# Patient Record
Sex: FEMALE | Race: WHITE | NOT HISPANIC OR LATINO | Employment: OTHER | URBAN - METROPOLITAN AREA
[De-identification: names, ages, dates, MRNs, and addresses within clinical notes are randomized per-mention and may not be internally consistent; named-entity substitution may affect disease eponyms.]

---

## 2019-05-20 ENCOUNTER — OFFICE VISIT (OUTPATIENT)
Dept: CARDIOLOGY | Facility: MEDICAL CENTER | Age: 69
End: 2019-05-20
Payer: MEDICARE

## 2019-05-20 VITALS
DIASTOLIC BLOOD PRESSURE: 84 MMHG | HEART RATE: 60 BPM | SYSTOLIC BLOOD PRESSURE: 130 MMHG | HEIGHT: 60 IN | BODY MASS INDEX: 34.16 KG/M2 | WEIGHT: 174 LBS

## 2019-05-20 DIAGNOSIS — F41.9 ANXIETY: ICD-10-CM

## 2019-05-20 DIAGNOSIS — I49.9 IRREGULAR HEART BEATS: ICD-10-CM

## 2019-05-20 PROCEDURE — 99203 OFFICE O/P NEW LOW 30 MIN: CPT | Performed by: INTERNAL MEDICINE

## 2019-05-20 PROCEDURE — 93000 ELECTROCARDIOGRAM COMPLETE: CPT | Performed by: INTERNAL MEDICINE

## 2019-05-20 RX ORDER — CITALOPRAM 20 MG/1
20 TABLET ORAL DAILY
COMMUNITY
End: 2019-05-20 | Stop reason: SDUPTHER

## 2019-05-20 RX ORDER — HYDROCHLOROTHIAZIDE 12.5 MG/1
12.5 TABLET ORAL DAILY
COMMUNITY
End: 2019-08-14

## 2019-05-20 RX ORDER — CITALOPRAM 20 MG/1
20 TABLET ORAL DAILY
Qty: 60 TAB | Refills: 1 | Status: SHIPPED | OUTPATIENT
Start: 2019-05-20 | End: 2021-06-04

## 2019-05-20 ASSESSMENT — ENCOUNTER SYMPTOMS
DEPRESSION: 0
HEADACHES: 0
PALPITATIONS: 1
BRUISES/BLEEDS EASILY: 0
PND: 0
NERVOUS/ANXIOUS: 1
CHILLS: 0
FEVER: 0
COUGH: 0
HEARTBURN: 0
MYALGIAS: 0
NAUSEA: 0
EYE DISCHARGE: 0
BLURRED VISION: 0
SHORTNESS OF BREATH: 0
DIZZINESS: 0

## 2019-05-20 NOTE — PROGRESS NOTES
Chief Complaint   Patient presents with   • Irregular Heart Beat       Subjective:   Sherry Louis is a 68 y.o. female who presents today self-referred with a chief complaint of palpitations.  In the recent past, she is noted upon reclining, no more than 2 minutes of occasional palpitations without sustained rapid heart rate.  Episodes resolve and then she goes to sleep.  She does not notice any palpitations during the day.  She considers himself physically active and does not feel limited in any way with regard to physical activity.  She does have hypertension and takes low-dose hydrochlorothiazide.  Home blood pressures with an old home blood pressure cuff have been around 146/85.    She has no other major health issues.    Past medical history: No significant disease other than hypertension and chronic anxiety.    Family history: Negative for premature CAD.    Office EKG today is normal    History reviewed. No pertinent past medical history.  History reviewed. No pertinent surgical history.  History reviewed. No pertinent family history.  Social History     Social History   • Marital status:      Spouse name: N/A   • Number of children: N/A   • Years of education: N/A     Occupational History   • Not on file.     Social History Main Topics   • Smoking status: Former Smoker     Packs/day: 0.50     Types: Cigarettes     Quit date: 5/20/1999   • Smokeless tobacco: Never Used   • Alcohol use Yes   • Drug use: No   • Sexual activity: Not on file     Other Topics Concern   • Not on file     Social History Narrative   • No narrative on file     No Known Allergies  Outpatient Encounter Prescriptions as of 5/20/2019   Medication Sig Dispense Refill   • hydroCHLOROthiazide (HYDRODIURIL) 12.5 MG tablet Take 12.5 mg by mouth every day.     • citalopram (CELEXA) 20 MG Tab Take 1 Tab by mouth every day. 60 Tab 1   • [DISCONTINUED] citalopram (CELEXA) 20 MG Tab Take 20 mg by mouth every day.       No  facility-administered encounter medications on file as of 5/20/2019.      Review of Systems   Constitutional: Negative for chills, fever and malaise/fatigue.   Eyes: Negative for blurred vision and discharge.   Respiratory: Negative for cough and shortness of breath.    Cardiovascular: Positive for palpitations. Negative for chest pain, leg swelling and PND.   Gastrointestinal: Negative for heartburn and nausea.   Genitourinary: Negative for dysuria and urgency.   Musculoskeletal: Negative for myalgias.   Skin: Negative for itching and rash.   Neurological: Negative for dizziness and headaches.   Endo/Heme/Allergies: Negative for environmental allergies. Does not bruise/bleed easily.   Psychiatric/Behavioral: Negative for depression. The patient is nervous/anxious.         Objective:   /84 (BP Location: Left arm, Patient Position: Sitting, BP Cuff Size: Adult)   Pulse 60   Ht 1.524 m (5')   Wt 78.9 kg (174 lb)   BMI 33.98 kg/m²     Physical Exam   Constitutional: She is oriented to person, place, and time. She appears well-nourished.   Neck: No JVD present.   Cardiovascular: Normal rate and regular rhythm.  Exam reveals no gallop and no friction rub.    No murmur heard.  No carotid bruits   Pulmonary/Chest: Effort normal and breath sounds normal.   Abdominal: Soft. There is no tenderness.   Musculoskeletal: She exhibits no edema or deformity.   Neurological: She is alert and oriented to person, place, and time.   Skin: Skin is warm.   Some ecchymosis on legs.       Assessment:     1. Irregular heart beats  EKG   2. Anxiety         Medical Decision Making:  Today's Assessment / Status / Plan:   Short-lived palpitations of no clinical concern and do not need follow-up monitoring.    Hypertension with no recent follow-up noted.  She will get a primary care provider.  In the meantime, she will buy a home blood pressure cuff and return in 4 to 6 weeks with the home blood pressure cuff to discuss her  hypertension.

## 2019-05-21 LAB — EKG IMPRESSION: NORMAL

## 2019-08-14 ENCOUNTER — OFFICE VISIT (OUTPATIENT)
Dept: CARDIOLOGY | Facility: MEDICAL CENTER | Age: 69
End: 2019-08-14
Payer: MEDICARE

## 2019-08-14 VITALS
HEIGHT: 60 IN | SYSTOLIC BLOOD PRESSURE: 172 MMHG | BODY MASS INDEX: 35.53 KG/M2 | DIASTOLIC BLOOD PRESSURE: 84 MMHG | WEIGHT: 181 LBS | OXYGEN SATURATION: 96 % | HEART RATE: 60 BPM

## 2019-08-14 DIAGNOSIS — R00.2 PALPITATIONS: ICD-10-CM

## 2019-08-14 DIAGNOSIS — I10 ESSENTIAL HYPERTENSION: ICD-10-CM

## 2019-08-14 DIAGNOSIS — F41.9 ANXIETY: ICD-10-CM

## 2019-08-14 PROBLEM — I49.9 IRREGULAR HEART BEATS: Status: ACTIVE | Noted: 2019-08-14

## 2019-08-14 PROCEDURE — 99214 OFFICE O/P EST MOD 30 MIN: CPT | Performed by: NURSE PRACTITIONER

## 2019-08-14 RX ORDER — HYDROCHLOROTHIAZIDE 25 MG/1
25 TABLET ORAL DAILY
Qty: 90 TAB | Refills: 3 | Status: SHIPPED | OUTPATIENT
Start: 2019-08-14 | End: 2021-09-03 | Stop reason: SDUPTHER

## 2019-08-14 ASSESSMENT — ENCOUNTER SYMPTOMS
ABDOMINAL PAIN: 0
MYALGIAS: 0
PALPITATIONS: 0
ORTHOPNEA: 0
WEAKNESS: 0
NERVOUS/ANXIOUS: 1
COUGH: 0
DIZZINESS: 0
SHORTNESS OF BREATH: 0
PND: 0
CLAUDICATION: 0

## 2019-08-14 NOTE — PROGRESS NOTES
Chief Complaint   Patient presents with   • Irregular Heart Beat       Subjective:   Sherry Louis is a 68 y.o. female who presents today to follow-up on palpitations and hypertension.  She was seen by Dr Hammer for palpitations in May of this year.  EKG was done and was essentially normal.  Patient states that she will have a thump sensation in her chest particularly in the evening when she is anxious.  She has no rapid, sustained heart rates.  She says since she has been seen in this office she is not even noticed her palpitations.    Her blood pressure was elevated therefore she was started on hydrochlorothiazide 12.5 mg.  She is been checking her blood pressure at home with an arm cuff and states that it is been running 130-140/70-90.  She exercises by walking 5 days a week for 40 minutes.  She tolerates this well.    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  She feels anxious frequently.    Past Medical History:   Diagnosis Date   • Hypertension      History reviewed. No pertinent surgical history.  History reviewed. No pertinent family history.  Social History     Socioeconomic History   • Marital status:      Spouse name: Not on file   • Number of children: Not on file   • Years of education: Not on file   • Highest education level: Not on file   Occupational History   • Not on file   Social Needs   • Financial resource strain: Not on file   • Food insecurity:     Worry: Not on file     Inability: Not on file   • Transportation needs:     Medical: Not on file     Non-medical: Not on file   Tobacco Use   • Smoking status: Former Smoker     Packs/day: 0.50     Types: Cigarettes     Last attempt to quit: 1999     Years since quittin.2   • Smokeless tobacco: Never Used   Substance and Sexual Activity   • Alcohol use: Yes   • Drug use: No   • Sexual activity: Not on file   Lifestyle   • Physical activity:     Days per week: Not on file     Minutes per session:  Not on file   • Stress: Not on file   Relationships   • Social connections:     Talks on phone: Not on file     Gets together: Not on file     Attends [employer] service: Not on file     Active member of club or organization: Not on file     Attends meetings of clubs or organizations: Not on file     Relationship status: Not on file   • Intimate partner violence:     Fear of current or ex partner: Not on file     Emotionally abused: Not on file     Physically abused: Not on file     Forced sexual activity: Not on file   Other Topics Concern   • Not on file   Social History Narrative   • Not on file     Allergies   Allergen Reactions   • Lisinopril Cough     Outpatient Encounter Medications as of 8/14/2019   Medication Sig Dispense Refill   • hydroCHLOROthiazide (HYDRODIURIL) 25 MG Tab Take 1 Tab by mouth every day. 90 Tab 3   • citalopram (CELEXA) 20 MG Tab Take 1 Tab by mouth every day. 60 Tab 1   • [DISCONTINUED] hydroCHLOROthiazide (HYDRODIURIL) 12.5 MG tablet Take 12.5 mg by mouth every day.       No facility-administered encounter medications on file as of 8/14/2019.      Review of Systems   Constitutional: Negative for malaise/fatigue.   Respiratory: Negative for cough and shortness of breath.    Cardiovascular: Negative for chest pain, palpitations, orthopnea, claudication, leg swelling and PND.   Gastrointestinal: Negative for abdominal pain.   Musculoskeletal: Negative for myalgias.   Neurological: Negative for dizziness and weakness.   Psychiatric/Behavioral: The patient is nervous/anxious (Frequently related to their business.).         Objective:   BP (!) 172/84 (BP Location: Left arm, Patient Position: Sitting, BP Cuff Size: Adult)   Pulse 60   Ht 1.524 m (5')   Wt 82.1 kg (181 lb)   SpO2 96%   BMI 35.35 kg/m²     Physical Exam   Constitutional: She is oriented to person, place, and time. She appears well-developed and well-nourished.   HENT:   Head: Normocephalic.   Eyes: EOM are normal.   Neck: No  JVD present.   Cardiovascular: Normal rate, regular rhythm and normal heart sounds.   Pulmonary/Chest: Effort normal and breath sounds normal.   Abdominal: Soft. Bowel sounds are normal.   Central obesity.   Musculoskeletal: She exhibits no edema.   Neurological: She is alert and oriented to person, place, and time.   Skin: Skin is warm and dry.   Psychiatric: She has a normal mood and affect.       Assessment:     1. Essential hypertension     2. Palpitations     3. Anxiety         Medical Decision Making:  Today's Assessment / Status / Plan:     Hypertension: Blood pressure is very elevated in the office today.  I am wondering about the accuracy of her blood pressure cuff.  She is planning on buying a new cuff.  I will increase hydrochlorothiazide to 25 mg daily.  She will reduce sodium in her diet.  Her blood pressure can be followed by us or a primary care doctor.  Her goal is 130 systolic or less.  She had cough from lisinopril but the next medicine should she need better blood pressure control I would recommend losartan.    Palpitations: She describes what sounds like ectopy.  She is reassured that this is benign and is not worried.    Anxiety: A lot of anxiety around there business.  I recommend she continue with her exercise.  She may want to take vitamin D as it can help with serotonin in the brain therefore reducing anxiety.    She will contact this office in a few weeks with her blood pressure readings.  She does not have a cardiac problem that needs to be followed in our office a primary care who can manage her blood pressure.  However she is always welcome to return to our office should she need our services.    Collaborating Provider: Dr Cook.    Please note that this dictation was created using voice recognition software. I have made every reasonable attempt to correct obvious errors, but it is possible there are errors of grammar and possibly content that I did not discover before finalizing  the note.

## 2021-01-15 DIAGNOSIS — Z23 NEED FOR VACCINATION: ICD-10-CM

## 2021-05-20 ENCOUNTER — PATIENT OUTREACH (OUTPATIENT)
Dept: HEALTH INFORMATION MANAGEMENT | Facility: OTHER | Age: 71
End: 2021-05-20

## 2021-05-20 NOTE — PROGRESS NOTES
Outcome: Left Message    Called pt on both tel #'s to schedule LANA. LVM requesting a return call.  Please transfer to Patient Outreach Team at [phone] when patient returns call.      Attempt # 1

## 2021-06-04 ENCOUNTER — OFFICE VISIT (OUTPATIENT)
Dept: MEDICAL GROUP | Facility: PHYSICIAN GROUP | Age: 71
End: 2021-06-04
Payer: MEDICARE

## 2021-06-04 ENCOUNTER — TELEPHONE (OUTPATIENT)
Dept: MEDICAL GROUP | Facility: PHYSICIAN GROUP | Age: 71
End: 2021-06-04

## 2021-06-04 VITALS
HEIGHT: 60 IN | WEIGHT: 174.8 LBS | OXYGEN SATURATION: 97 % | TEMPERATURE: 98.4 F | SYSTOLIC BLOOD PRESSURE: 164 MMHG | BODY MASS INDEX: 34.32 KG/M2 | RESPIRATION RATE: 14 BRPM | HEART RATE: 60 BPM | DIASTOLIC BLOOD PRESSURE: 80 MMHG

## 2021-06-04 DIAGNOSIS — Z23 NEED FOR DIPHTHERIA-TETANUS-PERTUSSIS (TDAP) VACCINE: ICD-10-CM

## 2021-06-04 DIAGNOSIS — Z76.89 ENCOUNTER TO ESTABLISH CARE WITH NEW DOCTOR: ICD-10-CM

## 2021-06-04 DIAGNOSIS — Z23 NEED FOR SHINGLES VACCINE: ICD-10-CM

## 2021-06-04 DIAGNOSIS — Z78.0 POSTMENOPAUSAL ESTROGEN DEFICIENCY: ICD-10-CM

## 2021-06-04 DIAGNOSIS — E66.9 OBESITY (BMI 30-39.9): ICD-10-CM

## 2021-06-04 DIAGNOSIS — Z11.59 ENCOUNTER FOR HEPATITIS C SCREENING TEST FOR LOW RISK PATIENT: ICD-10-CM

## 2021-06-04 DIAGNOSIS — Z12.11 COLON CANCER SCREENING: ICD-10-CM

## 2021-06-04 DIAGNOSIS — M85.88 OSTEOPENIA OF LUMBAR SPINE: ICD-10-CM

## 2021-06-04 DIAGNOSIS — Z12.31 ENCOUNTER FOR SCREENING MAMMOGRAM FOR MALIGNANT NEOPLASM OF BREAST: ICD-10-CM

## 2021-06-04 DIAGNOSIS — M71.379 SYNOVIAL CYST OF ANKLE AND FOOT REGION: ICD-10-CM

## 2021-06-04 DIAGNOSIS — K42.9 UMBILICAL HERNIA WITHOUT OBSTRUCTION AND WITHOUT GANGRENE: ICD-10-CM

## 2021-06-04 DIAGNOSIS — I10 ESSENTIAL HYPERTENSION: ICD-10-CM

## 2021-06-04 PROBLEM — I49.9 IRREGULAR HEART BEATS: Status: RESOLVED | Noted: 2019-08-14 | Resolved: 2021-06-04

## 2021-06-04 PROCEDURE — 90750 HZV VACC RECOMBINANT IM: CPT | Performed by: FAMILY MEDICINE

## 2021-06-04 PROCEDURE — 99204 OFFICE O/P NEW MOD 45 MIN: CPT | Mod: 25 | Performed by: FAMILY MEDICINE

## 2021-06-04 PROCEDURE — 90472 IMMUNIZATION ADMIN EACH ADD: CPT | Performed by: FAMILY MEDICINE

## 2021-06-04 PROCEDURE — 90471 IMMUNIZATION ADMIN: CPT | Performed by: FAMILY MEDICINE

## 2021-06-04 PROCEDURE — 90715 TDAP VACCINE 7 YRS/> IM: CPT | Performed by: FAMILY MEDICINE

## 2021-06-04 RX ORDER — ATENOLOL 50 MG/1
50 TABLET ORAL DAILY
Qty: 100 TABLET | Refills: 3 | Status: SHIPPED | OUTPATIENT
Start: 2021-06-04 | End: 2021-09-03 | Stop reason: SDUPTHER

## 2021-06-04 RX ORDER — FLUOXETINE HYDROCHLORIDE 20 MG/1
20 CAPSULE ORAL DAILY
COMMUNITY
End: 2021-09-03 | Stop reason: SDUPTHER

## 2021-06-04 ASSESSMENT — PATIENT HEALTH QUESTIONNAIRE - PHQ9: CLINICAL INTERPRETATION OF PHQ2 SCORE: 0

## 2021-06-04 NOTE — ASSESSMENT & PLAN NOTE
Patient is here today to establish care.  States she has not seen a doctor for quite some time and wants to get back on a good healthy tract.

## 2021-06-04 NOTE — ASSESSMENT & PLAN NOTE
This is a chronic problem.  Patient had a DEXA scan in 2004 that showed osteopenia to the lumbar spine.  It has not been repeated since then.

## 2021-06-04 NOTE — TELEPHONE ENCOUNTER
Pt called back to let our office know that she is on Atenolo 20mg qd.  Requesting refill be sent to her pharmacy.

## 2021-06-04 NOTE — ASSESSMENT & PLAN NOTE
This is a chronic problem.  Patient has had swelling to the lateral aspect of her left ankle for many years following a severe sprain.  She like to have it looked at today.  States it does not bother her.

## 2021-06-04 NOTE — ASSESSMENT & PLAN NOTE
This is a chronic problem.  Patient is currently on thiazide and some medication that she gets through the mail.  She will call us with the name of that.

## 2021-06-04 NOTE — PROGRESS NOTES
Subjective:     CC: Here to establish care and has several issues to go over.    HPI:   Sherry presents today with the following medical concerns:    Encounter to establish care with new doctor  Patient is here today to establish care.  States she has not seen a doctor for quite some time and wants to get back on a good healthy tract.    Umbilical hernia  This is a new problem.  Patient states she is noted to swelling just above her bellybutton over the last several weeks.  It is not uncomfortable.    Synovial cyst of ankle and foot region  This is a chronic problem.  Patient has had swelling to the lateral aspect of her left ankle for many years following a severe sprain.  She like to have it looked at today.  States it does not bother her.    Osteopenia of lumbar spine  This is a chronic problem.  Patient had a DEXA scan in  that showed osteopenia to the lumbar spine.  It has not been repeated since then.    Obesity (BMI 30-39.9)  This is a chronic problem.  Patient is working to try to reduce her weight.    Essential hypertension  This is a chronic problem.  Patient is currently on thiazide and some medication that she gets through the mail.  She will call us with the name of that.      Past Medical History:   Diagnosis Date   • Hypertension        Social History     Tobacco Use   • Smoking status: Former Smoker     Packs/day: 0.50     Types: Cigarettes     Quit date: 1999     Years since quittin.0   • Smokeless tobacco: Never Used   Vaping Use   • Vaping Use: Never used   Substance Use Topics   • Alcohol use: Yes     Comment: a bottle of wine per week   • Drug use: No       Current Outpatient Medications Ordered in Epic   Medication Sig Dispense Refill   • FLUoxetine (PROZAC) 20 MG Cap Take 20 mg by mouth every day.     • hydroCHLOROthiazide (HYDRODIURIL) 25 MG Tab Take 1 Tab by mouth every day. 90 Tab 3     No current Epic-ordered facility-administered medications on file.  "      Allergies:  Lisinopril    Health Maintenance: Completed    ROS:  Gen: no fevers/chills, no changes in weight  Eyes: no changes in vision  ENT: no sore throat, no hearing loss, no bloody nose  Pulm: no sob, no cough  CV: no chest pain, no palpitations  GI: no nausea/vomiting, no diarrhea  : no dysuria  MSk: no myalgias  Skin: no rash  Neuro: no headaches, no numbness/tingling  Heme/Lymph: no easy bruising      Objective:       Exam:  BP (!) 164/80 (BP Location: Left arm, Patient Position: Sitting, BP Cuff Size: Large adult)   Pulse 60   Temp 36.9 °C (98.4 °F) (Temporal)   Resp 14   Ht 1.511 m (4' 11.5\")   Wt 79.3 kg (174 lb 12.8 oz)   SpO2 97%   BMI 34.71 kg/m²  Body mass index is 34.71 kg/m².    Gen: Alert and oriented, No apparent distress.  Eyes:   No scleral icterus seen. extra motion appears intact.  ENT:    Ear canals and TMs are normal.  Throat is clear.  Neck: Neck is supple without lymphadenopathy.  Normal thyroid on palpation.  Lungs: Normal effort, CTA bilaterally, no wheezes, rhonchi, or rales  CV: Regular rate and rhythm. No murmurs, rubs, or gallops.  Abdomen: Soft, nontender, no organomegaly and normal bowel sounds.  There appears to be a small to medium sized hernia just above the umbilicus.  It is nontender and reducible.  Ext: No clubbing, cyanosis, edema.  There does appear to be a synovial cyst like lesion on the outside of her left ankle.  It is noninflamed.  Neuro: Cranial nerves II through XII grossly intact.  No lateralizing signs are seen.  Gait is normal.        Labs: Ordered    Assessment & Plan:     70 y.o. female with the following -     1. Encounter to establish care with new doctor  Patient establish care with me today.  We will get baseline labs.  And follow-up from there.    2. Osteopenia of lumbar spine  This is a chronic problem.  DEXA scan ordered.    3. Obesity (BMI 30-39.9)  This is a chronic problem.  Patient encouraged to continue to work on weight reduction.  - " TSH WITH REFLEX TO FT4; Future    4. Colon cancer screening  This is a screening issue.  Patient's last colonoscopy was more than 10 years ago and negative by history.  Cologuard ordered.  - COLOGUARD (FIT DNA)    5. Postmenopausal estrogen deficiency  This is a chronic problem.  DEXA scan ordered.  - DS-BONE DENSITY STUDY (DEXA); Future    6. Encounter for screening mammogram for malignant neoplasm of breast  This is a screening issue.  Patient does self breast exams reports no masses.  Mammogram ordered.  - MA-SCREENING MAMMO BILAT W/TOMOSYNTHESIS W/CAD; Future    7. Umbilical hernia without obstruction and without gangrene  This is a new problem.  Discussed with patient that it appears that she does have a hernia.  She does not want to do thing about at the present time.  If it becomes symptomatic we can refer her for repair.    8. Synovial cyst of ankle and foot region  This is a chronic problem.  Patient appears to have a synovial cyst to left ankle.  She does not want a referral at this time.    9. Need for diphtheria-tetanus-pertussis (Tdap) vaccine  This is an immunization issue.  Booster given.  - Tdap =>6yo IM    10. Need for shingles vaccine  This is an immunization issue.  Her first shingles vaccine given today.  Return in 2 to 6 months for the next 1.  - Shingles Vaccine (Shingrix)    11. Encounter for hepatitis C screening test for low risk patient  This is a screening issue.  Lab ordered.  - HEP C VIRUS ANTIBODY; Future    12. Essential hypertension  This is a chronic problem.  Patient's blood pressure is elevated today.  She will call us with the name of her other medications that she is taking and we can make adjustments on her medicines.  - Comp Metabolic Panel; Future  - Lipid Profile; Future  - URINALYSIS,CULTURE IF INDICATED; Future  - CBC WITH DIFFERENTIAL; Future  - ESTIMATED GFR; Future      Return in about 2 months (around 8/4/2021) for Long, wellness.  54 minutes spent with the patient and  going over her chart.  Please note that this dictation was created using voice recognition software. I have made every reasonable attempt to correct obvious errors, but I expect that there are errors of grammar and possibly content that I did not discover before finalizing the note.

## 2021-06-04 NOTE — ASSESSMENT & PLAN NOTE
This is a new problem.  Patient states she is noted to swelling just above her bellybutton over the last several weeks.  It is not uncomfortable.

## 2021-09-03 ENCOUNTER — OFFICE VISIT (OUTPATIENT)
Dept: MEDICAL GROUP | Facility: PHYSICIAN GROUP | Age: 71
End: 2021-09-03
Payer: MEDICARE

## 2021-09-03 VITALS
RESPIRATION RATE: 20 BRPM | HEIGHT: 60 IN | BODY MASS INDEX: 33.81 KG/M2 | OXYGEN SATURATION: 97 % | TEMPERATURE: 98.7 F | HEART RATE: 52 BPM | SYSTOLIC BLOOD PRESSURE: 130 MMHG | WEIGHT: 172.2 LBS | DIASTOLIC BLOOD PRESSURE: 74 MMHG

## 2021-09-03 DIAGNOSIS — F41.9 ANXIETY: ICD-10-CM

## 2021-09-03 DIAGNOSIS — I10 ESSENTIAL HYPERTENSION: ICD-10-CM

## 2021-09-03 DIAGNOSIS — Z23 NEED FOR VACCINATION: Primary | ICD-10-CM

## 2021-09-03 DIAGNOSIS — E66.9 OBESITY (BMI 30-39.9): ICD-10-CM

## 2021-09-03 DIAGNOSIS — Z00.00 MEDICARE ANNUAL WELLNESS VISIT, SUBSEQUENT: ICD-10-CM

## 2021-09-03 PROCEDURE — G0439 PPPS, SUBSEQ VISIT: HCPCS | Performed by: FAMILY MEDICINE

## 2021-09-03 PROCEDURE — 90750 HZV VACC RECOMBINANT IM: CPT | Performed by: FAMILY MEDICINE

## 2021-09-03 PROCEDURE — 90471 IMMUNIZATION ADMIN: CPT | Performed by: FAMILY MEDICINE

## 2021-09-03 RX ORDER — HYDROCHLOROTHIAZIDE 25 MG/1
25 TABLET ORAL DAILY
Qty: 100 TABLET | Refills: 3 | Status: SHIPPED | OUTPATIENT
Start: 2021-09-03 | End: 2022-09-03

## 2021-09-03 RX ORDER — ATENOLOL 50 MG/1
50 TABLET ORAL DAILY
Qty: 100 TABLET | Refills: 3 | Status: SHIPPED | OUTPATIENT
Start: 2021-09-03 | End: 2022-09-03

## 2021-09-03 RX ORDER — FLUOXETINE HYDROCHLORIDE 20 MG/1
20 CAPSULE ORAL DAILY
Qty: 100 CAPSULE | Refills: 3 | Status: SHIPPED | OUTPATIENT
Start: 2021-09-03 | End: 2022-09-03

## 2021-09-03 ASSESSMENT — ENCOUNTER SYMPTOMS: GENERAL WELL-BEING: GOOD

## 2021-09-03 ASSESSMENT — PATIENT HEALTH QUESTIONNAIRE - PHQ9: CLINICAL INTERPRETATION OF PHQ2 SCORE: 0

## 2021-09-03 ASSESSMENT — ACTIVITIES OF DAILY LIVING (ADL): BATHING_REQUIRES_ASSISTANCE: 0

## 2021-09-03 NOTE — ASSESSMENT & PLAN NOTE
Patient is here today for her wellness exam.  She is feeling very well.  She would like to talk about options to help her lose weight.  She has a friend who used Wegovy and she would like to consider getting that.  She is well aware that this is also diabetic medication.  She has not done her labs or x-ray studies that have been ordered previously but will get those done soon.

## 2021-09-03 NOTE — PROGRESS NOTES
Chief Complaint   Patient presents with   • Annual Wellness Visit         HPI:  Sherry Louis is a 70 y.o. here for Medicare Annual Wellness Visit   Medicare annual wellness visit, subsequent  Patient is here today for her wellness exam.  She is feeling very well.  She would like to talk about options to help her lose weight.  She has a friend who used Wegovy and she would like to consider getting that.  She is well aware that this is also diabetic medication.  She has not done her labs or x-ray studies that have been ordered previously but will get those done soon.    Essential hypertension  This is a chronic problem.  Patient's blood pressures well controlled on current medications.  She does need refills.    Anxiety  This is a chronic problem.  She does need medication refills.  Symptoms well controlled on current medicine.    Obesity (BMI 30-39.9)  This is a chronic problem.  We discussed diet and exercise.  She was told want to get her lab back if it is all normal we could send in a prescription for the Wegovy medication.      Patient Active Problem List    Diagnosis Date Noted   • Medicare annual wellness visit, subsequent 06/04/2021   • Osteopenia of lumbar spine 06/04/2021   • Obesity (BMI 30-39.9) 06/04/2021   • Umbilical hernia 06/04/2021   • Synovial cyst of ankle and foot region 06/04/2021   • Anxiety 08/14/2019   • Essential hypertension 08/14/2019   • Palpitations 08/14/2019       Current Outpatient Medications   Medication Sig Dispense Refill   • atenolol (TENORMIN) 50 MG Tab Take 1 Tablet by mouth every day. 100 Tablet 3   • FLUoxetine (PROZAC) 20 MG Cap Take 1 Capsule by mouth every day. 100 Capsule 3   • hydroCHLOROthiazide (HYDRODIURIL) 25 MG Tab Take 1 Tablet by mouth every day. 100 Tablet 3     No current facility-administered medications for this visit.            Current supplements as per medication list.       Allergies: Lisinopril    Current social contact/activities: Socially active  with friends and family.    She  reports that she quit smoking about 22 years ago. Her smoking use included cigarettes. She smoked 0.50 packs per day. She has never used smokeless tobacco. She reports current alcohol use. She reports that she does not use drugs.  Counseling given: Not Answered        DPA/Advanced Directive:  Patient has Advanced Directive, but it is not on file. Instructed to bring in a copy to scan into their chart.      ROS:    Gait: Uses no assistive device   Ostomy: no   Other tubes: no   Amputations: no   Chronic oxygen use: no   Last eye exam:2019   : Denies any urinary leakage during the last 6 months incontinence.       Screening:  done  Depression Screening    Little interest or pleasure in doing things?  0 - not at all  Feeling down, depressed , or hopeless? 0 - not at all  Patient Health Questionnaire Score: 0     If depressive symptoms identified deferred to follow up visit unless specifically addressed in assessment and plan.    Interpretation of PHQ-9 Total Score   Score Severity   1-4 No Depression   5-9 Mild Depression   10-14 Moderate Depression   15-19 Moderately Severe Depression   20-27 Severe Depression    Screening for Cognitive Impairment    Three Minute Recall (captain, garden, picture) 3/3 3/3, captain, garden, picture  Eliel clock face with all 12 numbers and set the hands to show 5 past 8.  Yes 5/5, 8:05  Cognitive concerns identified deferred for follow up unless specifically addressed in assessment and plan.    Fall Risk Assessment    Has the patient had two or more falls in the last year or any fall with injury in the last year?  No    Safety Assessment    Throw rugs on floor.  Yes  Handrails on all stairs.  Yes  Good lighting in all hallways.  No  Difficulty hearing.  Yes  Patient counseled about all safety risks that were identified.    Functional Assessment ADLs    Are there any barriers preventing you from cooking for yourself or meeting nutritional needs?  No.     Are there any barriers preventing you from driving safely or obtaining transportation?  No.    Are there any barriers preventing you from using a telephone or calling for help?  No.    Are there any barriers preventing you from shopping?  No.    Are there any barriers preventing you from taking care of your own finances?  No.    Are there any barriers preventing you from managing your medications?  No.    Are there any barriers preventing you from showering, bathing or dressing yourself?  No.    Are you currently engaging in any exercise or physical activity?  Yes.  walking  What is your perception of your health?  Good.      Health Maintenance Summary                COLORECTAL CANCER SCREENING Overdue 1949     HEPATITIS C SCREENING Overdue 1949     MAMMOGRAM Overdue 10/25/2008      Done 10/25/2007 MA-SCREENING DIGITAL MAMMO     Patient has more history with this topic...    BONE DENSITY Overdue 2014      Done 2004 DS-BONE DENSITY STUDY (DEXA)    IMM ZOSTER VACCINES Overdue 2021      Done 2021 Imm Admin: Zoster Vaccine Recombinant (RZV) (SHINGRIX)    IMM INFLUENZA Postponed 2022 Originally 2021. System: vaccine not available, other system reasons     Done 2020 Imm Admin: Influenza Vaccine Quad Inj (Pf)     Patient has more history with this topic...    IMM PNEUMOCOCCAL VACCINE: 65+ Years Postponed 9/3/2022 Originally 2014. Patient Refused    IMM DTaP/Tdap/Td Vaccine Next Due 2031      Done 2021 Imm Admin: Tdap Vaccine          Patient Care Team:  Sawyer Martínez III, M.D. as PCP - General (Family Medicine)        Social History     Tobacco Use   • Smoking status: Former Smoker     Packs/day: 0.50     Types: Cigarettes     Quit date: 1999     Years since quittin.3   • Smokeless tobacco: Never Used   Vaping Use   • Vaping Use: Never used   Substance Use Topics   • Alcohol use: Yes     Comment: a bottle of wine per week   • Drug use: No     Family  "History   Problem Relation Age of Onset   • Hypertension Father    • Hypertension Maternal Grandfather      She  has a past medical history of Hypertension.   Past Surgical History:   Procedure Laterality Date   • ABDOMINAL HYSTERECTOMY TOTAL         Exam:     /74 (BP Location: Left arm, Patient Position: Sitting, BP Cuff Size: Large adult)   Pulse (!) 52   Temp 37.1 °C (98.7 °F) (Temporal)   Resp 20   Ht 1.511 m (4' 11.5\")   Wt 78.1 kg (172 lb 3.2 oz)   SpO2 97%  Body mass index is 34.2 kg/m².    Hearing excellent.    Dentition good  Alert, oriented in no acute distress.  Eye contact is good, speech goal directed, affect calm      Assessment and Plan. The following treatment and monitoring plan is recommended:    1. Need for vaccination  Shingrix Vaccine   2. Medicare annual wellness visit, subsequent     3. Essential hypertension     4. Anxiety     5. Obesity (BMI 30-39.9)           Services suggested: No services needed at this time  Health Care Screening: Age-appropriate preventive services Medicare covers discussed today and ordered if indicated.  Referrals offered: Community-based lifestyle interventions to reduce health risks and promote self-management and wellness, fall prevention, nutrition, physical activity, tobacco-use cessation, weight loss, and mental health services as per orders if indicated.    Discussion today about general wellness and lifestyle habits:    · Prevent falls and reduce trip hazards; Cautioned about securing or removing rugs.  · Have a working fire alarm and carbon monoxide detector;   · Engage in regular physical activity and social activities       Follow-up: Return in about 6 months (around 3/3/2022) for Long.  "

## 2021-09-03 NOTE — PROGRESS NOTES
Chief Complaint   Patient presents with   • Annual Wellness Visit         HPI:  Sherry is a 70 y.o. here for Medicare Annual Wellness Visit        Patient Active Problem List    Diagnosis Date Noted   • Encounter to establish care with new doctor 06/04/2021   • Osteopenia of lumbar spine 06/04/2021   • Obesity (BMI 30-39.9) 06/04/2021   • Umbilical hernia 06/04/2021   • Synovial cyst of ankle and foot region 06/04/2021   • Anxiety 08/14/2019   • Essential hypertension 08/14/2019   • Palpitations 08/14/2019       Current Outpatient Medications   Medication Sig Dispense Refill   • FLUoxetine (PROZAC) 20 MG Cap Take 20 mg by mouth every day.     • atenolol (TENORMIN) 50 MG Tab Take 1 tablet by mouth every day. 100 tablet 3   • hydroCHLOROthiazide (HYDRODIURIL) 25 MG Tab Take 1 Tab by mouth every day. 90 Tab 3     No current facility-administered medications for this visit.        Patient is taking medications as noted in medication list.  Current supplements as per medication list.     Allergies: Lisinopril    Current social contact/activities: walking, social groups    Is patient current with immunizations? No, due for SHINGRIX (Shingles). Patient is interested in receiving SHINGRIX (Shingles) today.    She  reports that she quit smoking about 22 years ago. Her smoking use included cigarettes. She smoked 0.50 packs per day. She has never used smokeless tobacco. She reports current alcohol use. She reports that she does not use drugs.  Counseling given: Not Answered        DPA/Advanced directive: Patient has Advanced Directive, but it is not on file. Instructed to bring in a copy to scan into their chart.    ROS:    Gait: Uses no assistive device   Ostomy: No   Other tubes: No   Amputations: No   Chronic oxygen use No   Last eye exam :2019  Wears hearing aids: No   : Denies any urinary leakage during the last 6 months      Screening:  Annual Health Assessment Questions:    1.  Are you currently engaging in any  exercise or physical activity? Yes    2.  How would you describe your mood or emotional well-being today? good    3.  Have you had any falls in the last year? No    4.  Have you noticed any problems with your balance or had difficulty walking? No    5.  In the last six months have you experienced any leakage of urine? No    6. DPA/Advanced Directive: Patient has Advanced Directive, but it is not on file. Instructed to bring in a copy to scan into their chart.      Depression Screening    Little interest or pleasure in doing things?  0 - not at all  Feeling down, depressed, or hopeless? 0 - not at all  Patient Health Questionnaire Score: 0    If depressive symptoms identified deferred to follow up visit unless specifically addressed in assessment and plan.    Interpretation of PHQ-9 Total Score   Score Severity   1-4 No Depression   5-9 Mild Depression   10-14 Moderate Depression   15-19 Moderately Severe Depression   20-27 Severe Depression    Screening for Cognitive Impairment    Three Minute Recall (captain, garden, picture)  3/3 3/3, captain, garden, picture  Eliel clock face with all 12 numbers and set the hands to show 5 past 8.  Yes 5/5, 8:05  If cognitive concerns identified, deferred for follow up unless specifically addressed in assessment and plan.    Fall Risk Assessment    Has the patient had two or more falls in the last year or any fall with injury in the last year?  No  If fall risk identified, deferred for follow up unless specifically addressed in assessment and plan.    Safety Assessment    Throw rugs on floor.  Yes  Handrails on all stairs.  Yes  Good lighting in all hallways.  No  Difficulty hearing.  Yes  Patient counseled about all safety risks that were identified.    Functional Assessment ADLs    Are there any barriers preventing you from cooking for yourself or meeting nutritional needs?  No.    Are there any barriers preventing you from driving safely or obtaining transportation?  No.    Are  there any barriers preventing you from using a telephone or calling for help?  No.    Are there any barriers preventing you from shopping?  No.    Are there any barriers preventing you from taking care of your own finances?  No.    Are there any barriers preventing you from managing your medications?  No.    Are there any barriers preventing you from showering, bathing or dressing yourself?  No.    Are you currently engaging in any exercise or physical activity?  Yes.  walking  What is your perception of your health?  Good.    Health Maintenance Summary                Annual Wellness Visit Overdue 1949     COLORECTAL CANCER SCREENING Overdue 1949     HEPATITIS C SCREENING Overdue 1949     MAMMOGRAM Overdue 10/25/2008      Done 10/25/2007 MA-SCREENING DIGITAL MAMMO     Patient has more history with this topic...    BONE DENSITY Overdue 2014      Done 2004 DS-BONE DENSITY STUDY (DEXA)    IMM ZOSTER VACCINES Overdue 2021      Done 2021 Imm Admin: Zoster Vaccine Recombinant (RZV) (SHINGRIX)    IMM INFLUENZA Postponed 2022 Originally 2021. System: vaccine not available, other system reasons     Done 2020 Imm Admin: Influenza Vaccine Quad Inj (Pf)     Patient has more history with this topic...    IMM PNEUMOCOCCAL VACCINE: 65+ Years Postponed 9/3/2022 Originally 2014. Patient Refused    IMM DTaP/Tdap/Td Vaccine Next Due 2031      Done 2021 Imm Admin: Tdap Vaccine          Patient Care Team:  Sawyer Martínez III, M.D. as PCP - General (Family Medicine)    Social History     Tobacco Use   • Smoking status: Former Smoker     Packs/day: 0.50     Types: Cigarettes     Quit date: 1999     Years since quittin.3   • Smokeless tobacco: Never Used   Vaping Use   • Vaping Use: Never used   Substance Use Topics   • Alcohol use: Yes     Comment: a bottle of wine per week   • Drug use: No     Family History   Problem Relation Age of Onset   • Hypertension Father  "   • Hypertension Maternal Grandfather      She  has a past medical history of Hypertension.   Past Surgical History:   Procedure Laterality Date   • ABDOMINAL HYSTERECTOMY TOTAL             Exam:     /74 (BP Location: Left arm, Patient Position: Sitting, BP Cuff Size: Large adult)   Pulse (!) 52   Temp 37.1 °C (98.7 °F) (Temporal)   Resp 20   Ht 1.511 m (4' 11.5\")   Wt 78.1 kg (172 lb 3.2 oz)   SpO2 97%  Body mass index is 34.2 kg/m².    Hearing good.    Dentition good  Alert, oriented in no acute distress.  Eye contact is good, speech goal directed, affect calm      Assessment and Plan. The following treatment and monitoring plan is recommended:    1. Need for vaccination           Services suggested: No services needed at this time  Health Care Screening recommendations as per orders if indicated.  Referrals offered: PT/OT/Nutrition counseling/Behavioral Health/Smoking cessation as per orders if indicated.    Discussion today about general wellness and lifestyle habits:    · Prevent falls and reduce trip hazards; Cautioned about securing or removing rugs.  · Have a working fire alarm and carbon monoxide detector;   · Engage in regular physical activity and social activities       Follow-up: No follow-ups on file.  "

## 2021-09-03 NOTE — ASSESSMENT & PLAN NOTE
This is a chronic problem.  She does need medication refills.  Symptoms well controlled on current medicine.

## 2021-09-03 NOTE — ASSESSMENT & PLAN NOTE
This is a chronic problem.  Patient's blood pressures well controlled on current medications.  She does need refills.

## 2021-09-16 ENCOUNTER — HOSPITAL ENCOUNTER (OUTPATIENT)
Dept: LAB | Facility: MEDICAL CENTER | Age: 71
End: 2021-09-16
Attending: FAMILY MEDICINE
Payer: MEDICARE

## 2021-09-16 DIAGNOSIS — Z11.59 ENCOUNTER FOR HEPATITIS C SCREENING TEST FOR LOW RISK PATIENT: ICD-10-CM

## 2021-09-16 DIAGNOSIS — I10 ESSENTIAL HYPERTENSION: ICD-10-CM

## 2021-09-16 DIAGNOSIS — E66.9 OBESITY (BMI 30-39.9): ICD-10-CM

## 2021-09-16 LAB
ALBUMIN SERPL BCP-MCNC: 3.9 G/DL (ref 3.2–4.9)
ALBUMIN/GLOB SERPL: 1.2 G/DL
ALP SERPL-CCNC: 81 U/L (ref 30–99)
ALT SERPL-CCNC: 14 U/L (ref 2–50)
ANION GAP SERPL CALC-SCNC: 12 MMOL/L (ref 7–16)
APPEARANCE UR: ABNORMAL
AST SERPL-CCNC: 16 U/L (ref 12–45)
BACTERIA #/AREA URNS HPF: ABNORMAL /HPF
BASOPHILS # BLD AUTO: 0.4 % (ref 0–1.8)
BASOPHILS # BLD: 0.03 K/UL (ref 0–0.12)
BILIRUB SERPL-MCNC: 1 MG/DL (ref 0.1–1.5)
BILIRUB UR QL STRIP.AUTO: NEGATIVE
BUN SERPL-MCNC: 17 MG/DL (ref 8–22)
CALCIUM SERPL-MCNC: 8.8 MG/DL (ref 8.5–10.5)
CHLORIDE SERPL-SCNC: 103 MMOL/L (ref 96–112)
CHOLEST SERPL-MCNC: 220 MG/DL (ref 100–199)
CO2 SERPL-SCNC: 23 MMOL/L (ref 20–33)
COLOR UR: YELLOW
CREAT SERPL-MCNC: 0.77 MG/DL (ref 0.5–1.4)
EOSINOPHIL # BLD AUTO: 0.24 K/UL (ref 0–0.51)
EOSINOPHIL NFR BLD: 3.4 % (ref 0–6.9)
EPI CELLS #/AREA URNS HPF: ABNORMAL /HPF
ERYTHROCYTE [DISTWIDTH] IN BLOOD BY AUTOMATED COUNT: 45 FL (ref 35.9–50)
FASTING STATUS PATIENT QL REPORTED: NORMAL
GLOBULIN SER CALC-MCNC: 3.2 G/DL (ref 1.9–3.5)
GLUCOSE SERPL-MCNC: 97 MG/DL (ref 65–99)
GLUCOSE UR STRIP.AUTO-MCNC: NEGATIVE MG/DL
HCT VFR BLD AUTO: 39.1 % (ref 37–47)
HCV AB SER QL: NORMAL
HDLC SERPL-MCNC: 52 MG/DL
HGB BLD-MCNC: 12.5 G/DL (ref 12–16)
IMM GRANULOCYTES # BLD AUTO: 0.02 K/UL (ref 0–0.11)
IMM GRANULOCYTES NFR BLD AUTO: 0.3 % (ref 0–0.9)
KETONES UR STRIP.AUTO-MCNC: NEGATIVE MG/DL
LDLC SERPL CALC-MCNC: 132 MG/DL
LEUKOCYTE ESTERASE UR QL STRIP.AUTO: ABNORMAL
LYMPHOCYTES # BLD AUTO: 1.9 K/UL (ref 1–4.8)
LYMPHOCYTES NFR BLD: 26.9 % (ref 22–41)
MCH RBC QN AUTO: 30.3 PG (ref 27–33)
MCHC RBC AUTO-ENTMCNC: 32 G/DL (ref 33.6–35)
MCV RBC AUTO: 94.9 FL (ref 81.4–97.8)
MICRO URNS: ABNORMAL
MONOCYTES # BLD AUTO: 0.44 K/UL (ref 0–0.85)
MONOCYTES NFR BLD AUTO: 6.2 % (ref 0–13.4)
NEUTROPHILS # BLD AUTO: 4.43 K/UL (ref 2–7.15)
NEUTROPHILS NFR BLD: 62.8 % (ref 44–72)
NITRITE UR QL STRIP.AUTO: NEGATIVE
NRBC # BLD AUTO: 0 K/UL
NRBC BLD-RTO: 0 /100 WBC
PH UR STRIP.AUTO: 7 [PH] (ref 5–8)
PLATELET # BLD AUTO: 270 K/UL (ref 164–446)
PMV BLD AUTO: 10.6 FL (ref 9–12.9)
POTASSIUM SERPL-SCNC: 4.1 MMOL/L (ref 3.6–5.5)
PROT SERPL-MCNC: 7.1 G/DL (ref 6–8.2)
PROT UR QL STRIP: NEGATIVE MG/DL
RBC # BLD AUTO: 4.12 M/UL (ref 4.2–5.4)
RBC # URNS HPF: ABNORMAL /HPF
RBC UR QL AUTO: NEGATIVE
SODIUM SERPL-SCNC: 138 MMOL/L (ref 135–145)
SP GR UR STRIP.AUTO: 1.02
TRIGL SERPL-MCNC: 182 MG/DL (ref 0–149)
TSH SERPL DL<=0.005 MIU/L-ACNC: 2.41 UIU/ML (ref 0.38–5.33)
UROBILINOGEN UR STRIP.AUTO-MCNC: 0.2 MG/DL
WBC # BLD AUTO: 7.1 K/UL (ref 4.8–10.8)
WBC #/AREA URNS HPF: ABNORMAL /HPF

## 2021-09-16 PROCEDURE — 36415 COLL VENOUS BLD VENIPUNCTURE: CPT

## 2021-09-16 PROCEDURE — 85025 COMPLETE CBC W/AUTO DIFF WBC: CPT

## 2021-09-16 PROCEDURE — 86803 HEPATITIS C AB TEST: CPT

## 2021-09-16 PROCEDURE — 80053 COMPREHEN METABOLIC PANEL: CPT

## 2021-09-16 PROCEDURE — 84443 ASSAY THYROID STIM HORMONE: CPT

## 2021-09-16 PROCEDURE — 87086 URINE CULTURE/COLONY COUNT: CPT

## 2021-09-16 PROCEDURE — 80061 LIPID PANEL: CPT

## 2021-09-16 PROCEDURE — 81001 URINALYSIS AUTO W/SCOPE: CPT

## 2021-09-18 LAB
BACTERIA UR CULT: NORMAL
SIGNIFICANT IND 70042: NORMAL
SITE SITE: NORMAL
SOURCE SOURCE: NORMAL

## 2021-09-20 ENCOUNTER — TELEPHONE (OUTPATIENT)
Dept: MEDICAL GROUP | Facility: PHYSICIAN GROUP | Age: 71
End: 2021-09-20

## 2021-09-20 NOTE — TELEPHONE ENCOUNTER
----- Message from Sawyer Martínez III, M.D. sent at 9/20/2021  7:53 AM PDT -----  Let her know that her labs all came back looking good except her cholesterol and LDL are borderline elevated.  Ask if she like to get a prescription for the diet medication at this time.  Otherwise she should be on a low-cholesterol diet and recheck labs in 1 year.Dr. Martínez

## 2021-09-20 NOTE — TELEPHONE ENCOUNTER
1st attempt.  LVM to call clinic to call me back at [phone] regarding Dr Sawyer Martínez message below.

## 2021-09-21 RX ORDER — SEMAGLUTIDE 0.25 MG/.5ML
0.25 INJECTION, SOLUTION SUBCUTANEOUS
Qty: 4 EACH | Refills: 0 | Status: SHIPPED | OUTPATIENT
Start: 2021-09-21 | End: 2022-11-11

## 2021-09-21 NOTE — TELEPHONE ENCOUNTER
Pt called back and she was informed of Dr Sawyer Martínez message below.  Pt would like to have a prescription for the diet medication sent to her pharmacy.

## 2021-12-16 NOTE — NON-PROVIDER
Outcome: Left Message    Please transfer to Patient Outreach Team at [phone] when patient returns call.      [facility] Verified: yes    Attempt # 2

## 2022-03-25 ENCOUNTER — PATIENT MESSAGE (OUTPATIENT)
Dept: HEALTH INFORMATION MANAGEMENT | Facility: OTHER | Age: 72
End: 2022-03-25

## 2022-05-03 ENCOUNTER — TELEPHONE (OUTPATIENT)
Dept: HEALTH INFORMATION MANAGEMENT | Facility: OTHER | Age: 72
End: 2022-05-03

## 2022-05-03 NOTE — TELEPHONE ENCOUNTER
Called to [facility] [[email or web address]] HEALTH ASSESSMENT. LVM stating I was calling regarding a Preventive Screening and to update  file (verify PCP)    Please transfer to Member to Outreach Team at [phone] when patient returns call.

## 2022-10-28 ENCOUNTER — DOCUMENTATION (OUTPATIENT)
Dept: HEALTH INFORMATION MANAGEMENT | Facility: OTHER | Age: 72
End: 2022-10-28
Payer: MEDICARE

## 2022-11-07 DIAGNOSIS — I10 ESSENTIAL HYPERTENSION: ICD-10-CM

## 2022-11-10 ENCOUNTER — HOSPITAL ENCOUNTER (OUTPATIENT)
Dept: LAB | Facility: MEDICAL CENTER | Age: 72
End: 2022-11-10
Attending: FAMILY MEDICINE
Payer: MEDICARE

## 2022-11-10 DIAGNOSIS — I10 ESSENTIAL HYPERTENSION: ICD-10-CM

## 2022-11-10 LAB
ALBUMIN SERPL BCP-MCNC: 4.3 G/DL (ref 3.2–4.9)
ALBUMIN/GLOB SERPL: 1.4 G/DL
ALP SERPL-CCNC: 75 U/L (ref 30–99)
ALT SERPL-CCNC: 19 U/L (ref 2–50)
ANION GAP SERPL CALC-SCNC: 11 MMOL/L (ref 7–16)
APPEARANCE UR: CLEAR
AST SERPL-CCNC: 25 U/L (ref 12–45)
BACTERIA #/AREA URNS HPF: NEGATIVE /HPF
BASOPHILS # BLD AUTO: 0.3 % (ref 0–1.8)
BASOPHILS # BLD: 0.02 K/UL (ref 0–0.12)
BILIRUB SERPL-MCNC: 0.8 MG/DL (ref 0.1–1.5)
BILIRUB UR QL STRIP.AUTO: NEGATIVE
BUN SERPL-MCNC: 14 MG/DL (ref 8–22)
CALCIUM SERPL-MCNC: 9 MG/DL (ref 8.5–10.5)
CHLORIDE SERPL-SCNC: 103 MMOL/L (ref 96–112)
CHOLEST SERPL-MCNC: 199 MG/DL (ref 100–199)
CO2 SERPL-SCNC: 23 MMOL/L (ref 20–33)
COLOR UR: YELLOW
CREAT SERPL-MCNC: 0.77 MG/DL (ref 0.5–1.4)
EOSINOPHIL # BLD AUTO: 0.29 K/UL (ref 0–0.51)
EOSINOPHIL NFR BLD: 4.7 % (ref 0–6.9)
EPI CELLS #/AREA URNS HPF: NEGATIVE /HPF
ERYTHROCYTE [DISTWIDTH] IN BLOOD BY AUTOMATED COUNT: 46.4 FL (ref 35.9–50)
FASTING STATUS PATIENT QL REPORTED: NORMAL
GFR SERPLBLD CREATININE-BSD FMLA CKD-EPI: 81 ML/MIN/1.73 M 2
GLOBULIN SER CALC-MCNC: 3 G/DL (ref 1.9–3.5)
GLUCOSE SERPL-MCNC: 87 MG/DL (ref 65–99)
GLUCOSE UR STRIP.AUTO-MCNC: NEGATIVE MG/DL
HCT VFR BLD AUTO: 40.7 % (ref 37–47)
HDLC SERPL-MCNC: 54 MG/DL
HGB BLD-MCNC: 12.9 G/DL (ref 12–16)
HYALINE CASTS #/AREA URNS LPF: NORMAL /LPF
IMM GRANULOCYTES # BLD AUTO: 0.02 K/UL (ref 0–0.11)
IMM GRANULOCYTES NFR BLD AUTO: 0.3 % (ref 0–0.9)
KETONES UR STRIP.AUTO-MCNC: NEGATIVE MG/DL
LDLC SERPL CALC-MCNC: 122 MG/DL
LEUKOCYTE ESTERASE UR QL STRIP.AUTO: ABNORMAL
LYMPHOCYTES # BLD AUTO: 1.27 K/UL (ref 1–4.8)
LYMPHOCYTES NFR BLD: 20.4 % (ref 22–41)
MCH RBC QN AUTO: 30.4 PG (ref 27–33)
MCHC RBC AUTO-ENTMCNC: 31.7 G/DL (ref 33.6–35)
MCV RBC AUTO: 96 FL (ref 81.4–97.8)
MICRO URNS: ABNORMAL
MONOCYTES # BLD AUTO: 0.41 K/UL (ref 0–0.85)
MONOCYTES NFR BLD AUTO: 6.6 % (ref 0–13.4)
NEUTROPHILS # BLD AUTO: 4.22 K/UL (ref 2–7.15)
NEUTROPHILS NFR BLD: 67.7 % (ref 44–72)
NITRITE UR QL STRIP.AUTO: NEGATIVE
NRBC # BLD AUTO: 0 K/UL
NRBC BLD-RTO: 0 /100 WBC
PH UR STRIP.AUTO: 7 [PH] (ref 5–8)
PLATELET # BLD AUTO: 330 K/UL (ref 164–446)
PMV BLD AUTO: 10.9 FL (ref 9–12.9)
POTASSIUM SERPL-SCNC: 4.6 MMOL/L (ref 3.6–5.5)
PROT SERPL-MCNC: 7.3 G/DL (ref 6–8.2)
PROT UR QL STRIP: NEGATIVE MG/DL
RBC # BLD AUTO: 4.24 M/UL (ref 4.2–5.4)
RBC # URNS HPF: NORMAL /HPF
RBC UR QL AUTO: NEGATIVE
SODIUM SERPL-SCNC: 137 MMOL/L (ref 135–145)
SP GR UR STRIP.AUTO: 1.01
TRIGL SERPL-MCNC: 114 MG/DL (ref 0–149)
UROBILINOGEN UR STRIP.AUTO-MCNC: 0.2 MG/DL
WBC # BLD AUTO: 6.2 K/UL (ref 4.8–10.8)
WBC #/AREA URNS HPF: NORMAL /HPF

## 2022-11-10 PROCEDURE — 80053 COMPREHEN METABOLIC PANEL: CPT

## 2022-11-10 PROCEDURE — 80061 LIPID PANEL: CPT

## 2022-11-10 PROCEDURE — 36415 COLL VENOUS BLD VENIPUNCTURE: CPT

## 2022-11-10 PROCEDURE — 81001 URINALYSIS AUTO W/SCOPE: CPT

## 2022-11-10 PROCEDURE — 85025 COMPLETE CBC W/AUTO DIFF WBC: CPT

## 2022-11-11 ENCOUNTER — OFFICE VISIT (OUTPATIENT)
Dept: MEDICAL GROUP | Facility: PHYSICIAN GROUP | Age: 72
End: 2022-11-11
Payer: MEDICARE

## 2022-11-11 VITALS
WEIGHT: 171.2 LBS | OXYGEN SATURATION: 96 % | SYSTOLIC BLOOD PRESSURE: 138 MMHG | TEMPERATURE: 97.4 F | RESPIRATION RATE: 16 BRPM | DIASTOLIC BLOOD PRESSURE: 78 MMHG | HEIGHT: 60 IN | HEART RATE: 50 BPM | BODY MASS INDEX: 33.61 KG/M2

## 2022-11-11 DIAGNOSIS — Z00.00 WELL ADULT EXAM: ICD-10-CM

## 2022-11-11 DIAGNOSIS — I10 ESSENTIAL HYPERTENSION: ICD-10-CM

## 2022-11-11 DIAGNOSIS — E66.9 OBESITY (BMI 30-39.9): ICD-10-CM

## 2022-11-11 DIAGNOSIS — F41.9 ANXIETY: ICD-10-CM

## 2022-11-11 DIAGNOSIS — Z12.31 ENCOUNTER FOR SCREENING MAMMOGRAM FOR MALIGNANT NEOPLASM OF BREAST: ICD-10-CM

## 2022-11-11 DIAGNOSIS — Z23 NEED FOR VACCINATION: ICD-10-CM

## 2022-11-11 DIAGNOSIS — Z78.0 POSTMENOPAUSAL ESTROGEN DEFICIENCY: ICD-10-CM

## 2022-11-11 PROCEDURE — G0009 ADMIN PNEUMOCOCCAL VACCINE: HCPCS | Performed by: FAMILY MEDICINE

## 2022-11-11 PROCEDURE — 99214 OFFICE O/P EST MOD 30 MIN: CPT | Mod: 25 | Performed by: FAMILY MEDICINE

## 2022-11-11 PROCEDURE — 90677 PCV20 VACCINE IM: CPT | Performed by: FAMILY MEDICINE

## 2022-11-11 RX ORDER — ATENOLOL 50 MG/1
50 TABLET ORAL DAILY
COMMUNITY
End: 2022-11-11 | Stop reason: SDUPTHER

## 2022-11-11 RX ORDER — FLUOXETINE HYDROCHLORIDE 20 MG/1
20 CAPSULE ORAL DAILY
Qty: 100 CAPSULE | Refills: 3 | Status: SHIPPED | OUTPATIENT
Start: 2022-11-11 | End: 2024-03-07 | Stop reason: SDUPTHER

## 2022-11-11 RX ORDER — ATENOLOL 50 MG/1
50 TABLET ORAL DAILY
Qty: 100 TABLET | Refills: 3 | Status: SHIPPED | OUTPATIENT
Start: 2022-11-11 | End: 2024-03-07 | Stop reason: SDUPTHER

## 2022-11-11 RX ORDER — SEMAGLUTIDE 0.25 MG/.5ML
0.25 INJECTION, SOLUTION SUBCUTANEOUS
Qty: 4 EACH | Refills: 0 | Status: SHIPPED
Start: 2022-11-11 | End: 2023-02-22

## 2022-11-11 RX ORDER — HYDROCHLOROTHIAZIDE 25 MG/1
25 TABLET ORAL DAILY
Qty: 100 TABLET | Refills: 3 | Status: SHIPPED | OUTPATIENT
Start: 2022-11-11 | End: 2024-03-07 | Stop reason: SDUPTHER

## 2022-11-11 RX ORDER — HYDROCHLOROTHIAZIDE 25 MG/1
25 TABLET ORAL DAILY
COMMUNITY
End: 2022-11-11 | Stop reason: SDUPTHER

## 2022-11-11 RX ORDER — FLUOXETINE HYDROCHLORIDE 20 MG/1
20 CAPSULE ORAL DAILY
COMMUNITY
End: 2022-11-11 | Stop reason: SDUPTHER

## 2022-11-11 ASSESSMENT — FIBROSIS 4 INDEX: FIB4 SCORE: 1.25

## 2022-11-11 ASSESSMENT — PATIENT HEALTH QUESTIONNAIRE - PHQ9: CLINICAL INTERPRETATION OF PHQ2 SCORE: 0

## 2022-11-11 NOTE — PROGRESS NOTES
Subjective:     CC: Here for her annual checkup.    HPI:   Sherry presents today with the following medical concerns:    Essential hypertension  Patient is here for follow-up and needs refills on her medication.  She did do her labs today and they all look very good.    Anxiety  This is a chronic stable condition.  Patient is needing a refill on her medication.    Obesity (BMI 30-39.9)  This is a chronic problem.  Patient would like to see if these Semaglutide can be rewritten and if it is affordable for her to try to assist her in weight reduction.    Well adult exam  Patient is also here for her annual exam.  She has not done her mammogram or DEXA scan yet.  She like those reordered.  She does have the Cologuard box still at home.  She is going to check to see if it still within his expiration date.  If not she will call let me know and I will send in a new order for her.    Past Medical History:   Diagnosis Date    Hypertension        Social History     Tobacco Use    Smoking status: Former     Packs/day: 0.50     Types: Cigarettes     Quit date: 1999     Years since quittin.4    Smokeless tobacco: Never   Vaping Use    Vaping Use: Never used   Substance Use Topics    Alcohol use: Yes     Comment: a bottle of wine per week    Drug use: No       Current Outpatient Medications Ordered in Epic   Medication Sig Dispense Refill    Semaglutide (WEGOVY) 0.25 MG/0.5ML Solution Auto-injector Pen-injector Inject 0.5 mL under the skin every 7 days. 4 Each 0    atenolol (TENORMIN) 50 MG Tab Take 1 Tablet by mouth every day. 100 Tablet 3    FLUoxetine (PROZAC) 20 MG Cap Take 1 Capsule by mouth every day. 100 Capsule 3    hydroCHLOROthiazide (HYDRODIURIL) 25 MG Tab Take 1 Tablet by mouth every day. 100 Tablet 3     No current Epic-ordered facility-administered medications on file.       Allergies:  Lisinopril    Health Maintenance: Completed    ROS:  Gen: no fevers/chills, no changes in weight  Eyes: no changes in  "vision  ENT: no sore throat, no hearing loss, no bloody nose  Pulm: no sob, no cough  CV: no chest pain, no palpitations  GI: no nausea/vomiting, no diarrhea  : no dysuria  MSk: no myalgias  Skin: no rash  Neuro: no headaches, no numbness/tingling  Heme/Lymph: no easy bruising      Objective:       Exam:  /78 (BP Location: Left arm, Patient Position: Sitting, BP Cuff Size: Adult)   Pulse (!) 50   Temp 36.3 °C (97.4 °F) (Temporal)   Resp 16   Ht 1.511 m (4' 11.5\")   Wt 77.7 kg (171 lb 3.2 oz)   SpO2 96%   BMI 34.00 kg/m²  Body mass index is 34 kg/m².    Gen: Alert and oriented, No apparent distress.  Eyes:   Extraocular motions intact.  No scleral icterus seen.  Ears:    Ear canals and TMs are normal.  Neck: Neck is supple without lymphadenopathy.  Thyroid exam is normal.  Lungs: Normal effort, CTA bilaterally, no wheezes, rhonchi, or rales  CV: Regular rate and rhythm. No murmurs, rubs, or gallops.  Abdomen: Soft, nontender, no again megaly or masses.  Normal bowel sounds.  Ext: No clubbing, cyanosis, edema.  Neuro: Cranial nerves II through VIII are grossly intact.  No lateralized signs are seen.  Gait is normal.  Psych: Patient is alert and oriented x3.  No unusual topics expressed.  Insight and judgment is good.      Labs: Results reviewed with patient.    Assessment & Plan:     71 y.o. female with the following -     1. Need for vaccination  Pneumonia vaccine given today.  - Pneumococcal Conjugate Vaccine 20-Valent (19 yrs+)    2. Essential hypertension  This is a chronic stable condition.  Medications renewed.    3. Anxiety  This is a chronic stable condition.  Medication renewed.    4. Postmenopausal estrogen deficiency  This is a screening issue.  DEXA scan ordered.  - DS-BONE DENSITY STUDY (DEXA); Future    5. Encounter for screening mammogram for malignant neoplasm of breast  This is a screening issue.  Mammogram ordered.  - MA-SCREENING MAMMO BILAT W/TOMOSYNTHESIS W/CAD; Future    6. Well " adult exam  Patient's exam today appeared very good.  I went over her lab test results with her.    7. Obesity (BMI 30-39.9)  This is a chronic problem.  Continue to work on weight reduction.  We will send in the medication again to see if it can be authorized and affordable.  - Patient identified as having weight management issue.  Appropriate orders and counseling given.      Return in about 6 months (around 5/11/2023) for Long.    Please note that this dictation was created using voice recognition software. I have made every reasonable attempt to correct obvious errors, but I expect that there are errors of grammar and possibly content that I did not discover before finalizing the note.

## 2022-11-11 NOTE — ASSESSMENT & PLAN NOTE
This is a chronic problem.  Patient would like to see if these Semaglutide can be rewritten and if it is affordable for her to try to assist her in weight reduction.

## 2022-11-11 NOTE — ASSESSMENT & PLAN NOTE
Patient is here for follow-up and needs refills on her medication.  She did do her labs today and they all look very good.

## 2023-02-22 ENCOUNTER — HOSPITAL ENCOUNTER (INPATIENT)
Facility: MEDICAL CENTER | Age: 73
LOS: 3 days | DRG: 417 | End: 2023-02-25
Attending: EMERGENCY MEDICINE | Admitting: SURGERY
Payer: MEDICARE

## 2023-02-22 ENCOUNTER — APPOINTMENT (OUTPATIENT)
Dept: RADIOLOGY | Facility: MEDICAL CENTER | Age: 73
DRG: 417 | End: 2023-02-22
Attending: SURGERY
Payer: MEDICARE

## 2023-02-22 ENCOUNTER — APPOINTMENT (OUTPATIENT)
Dept: RADIOLOGY | Facility: MEDICAL CENTER | Age: 73
DRG: 417 | End: 2023-02-22
Attending: EMERGENCY MEDICINE
Payer: MEDICARE

## 2023-02-22 PROBLEM — K85.10 GALLSTONE PANCREATITIS: Status: ACTIVE | Noted: 2023-02-22

## 2023-02-22 LAB
ALBUMIN SERPL BCP-MCNC: 4.2 G/DL (ref 3.2–4.9)
ALBUMIN/GLOB SERPL: 1.3 G/DL
ALP SERPL-CCNC: 195 U/L (ref 30–99)
ALT SERPL-CCNC: 560 U/L (ref 2–50)
ANION GAP SERPL CALC-SCNC: 13 MMOL/L (ref 7–16)
AST SERPL-CCNC: 752 U/L (ref 12–45)
BASOPHILS # BLD AUTO: 0.1 % (ref 0–1.8)
BASOPHILS # BLD: 0.01 K/UL (ref 0–0.12)
BILIRUB SERPL-MCNC: 3.3 MG/DL (ref 0.1–1.5)
BUN SERPL-MCNC: 16 MG/DL (ref 8–22)
CALCIUM ALBUM COR SERPL-MCNC: 8.9 MG/DL (ref 8.5–10.5)
CALCIUM SERPL-MCNC: 9.1 MG/DL (ref 8.5–10.5)
CHLORIDE SERPL-SCNC: 102 MMOL/L (ref 96–112)
CO2 SERPL-SCNC: 23 MMOL/L (ref 20–33)
CREAT SERPL-MCNC: 0.71 MG/DL (ref 0.5–1.4)
EKG IMPRESSION: NORMAL
EOSINOPHIL # BLD AUTO: 0.01 K/UL (ref 0–0.51)
EOSINOPHIL NFR BLD: 0.1 % (ref 0–6.9)
ERYTHROCYTE [DISTWIDTH] IN BLOOD BY AUTOMATED COUNT: 45.6 FL (ref 35.9–50)
GFR SERPLBLD CREATININE-BSD FMLA CKD-EPI: 90 ML/MIN/1.73 M 2
GLOBULIN SER CALC-MCNC: 3.2 G/DL (ref 1.9–3.5)
GLUCOSE SERPL-MCNC: 167 MG/DL (ref 65–99)
HCT VFR BLD AUTO: 42.1 % (ref 37–47)
HGB BLD-MCNC: 13.5 G/DL (ref 12–16)
IMM GRANULOCYTES # BLD AUTO: 0.05 K/UL (ref 0–0.11)
IMM GRANULOCYTES NFR BLD AUTO: 0.5 % (ref 0–0.9)
LIPASE SERPL-CCNC: 537 U/L (ref 11–82)
LYMPHOCYTES # BLD AUTO: 0.4 K/UL (ref 1–4.8)
LYMPHOCYTES NFR BLD: 3.6 % (ref 22–41)
MCH RBC QN AUTO: 30.5 PG (ref 27–33)
MCHC RBC AUTO-ENTMCNC: 32.1 G/DL (ref 33.6–35)
MCV RBC AUTO: 95.2 FL (ref 81.4–97.8)
MONOCYTES # BLD AUTO: 0.51 K/UL (ref 0–0.85)
MONOCYTES NFR BLD AUTO: 4.6 % (ref 0–13.4)
NEUTROPHILS # BLD AUTO: 10.02 K/UL (ref 2–7.15)
NEUTROPHILS NFR BLD: 91.1 % (ref 44–72)
NRBC # BLD AUTO: 0 K/UL
NRBC BLD-RTO: 0 /100 WBC
PLATELET # BLD AUTO: 239 K/UL (ref 164–446)
PMV BLD AUTO: 10.7 FL (ref 9–12.9)
POTASSIUM SERPL-SCNC: 3.7 MMOL/L (ref 3.6–5.5)
PROT SERPL-MCNC: 7.4 G/DL (ref 6–8.2)
RBC # BLD AUTO: 4.42 M/UL (ref 4.2–5.4)
SODIUM SERPL-SCNC: 138 MMOL/L (ref 135–145)
WBC # BLD AUTO: 11 K/UL (ref 4.8–10.8)

## 2023-02-22 PROCEDURE — 700111 HCHG RX REV CODE 636 W/ 250 OVERRIDE (IP): Performed by: SURGERY

## 2023-02-22 PROCEDURE — 76705 ECHO EXAM OF ABDOMEN: CPT

## 2023-02-22 PROCEDURE — 93005 ELECTROCARDIOGRAM TRACING: CPT

## 2023-02-22 PROCEDURE — 85025 COMPLETE CBC W/AUTO DIFF WBC: CPT

## 2023-02-22 PROCEDURE — 99285 EMERGENCY DEPT VISIT HI MDM: CPT

## 2023-02-22 PROCEDURE — 83690 ASSAY OF LIPASE: CPT

## 2023-02-22 PROCEDURE — 93005 ELECTROCARDIOGRAM TRACING: CPT | Performed by: EMERGENCY MEDICINE

## 2023-02-22 PROCEDURE — 770001 HCHG ROOM/CARE - MED/SURG/GYN PRIV*

## 2023-02-22 PROCEDURE — 36415 COLL VENOUS BLD VENIPUNCTURE: CPT

## 2023-02-22 PROCEDURE — 700101 HCHG RX REV CODE 250: Performed by: EMERGENCY MEDICINE

## 2023-02-22 PROCEDURE — 700111 HCHG RX REV CODE 636 W/ 250 OVERRIDE (IP): Performed by: EMERGENCY MEDICINE

## 2023-02-22 PROCEDURE — 96375 TX/PRO/DX INJ NEW DRUG ADDON: CPT

## 2023-02-22 PROCEDURE — 700105 HCHG RX REV CODE 258: Performed by: SURGERY

## 2023-02-22 PROCEDURE — 80053 COMPREHEN METABOLIC PANEL: CPT

## 2023-02-22 PROCEDURE — 99222 1ST HOSP IP/OBS MODERATE 55: CPT | Performed by: SURGERY

## 2023-02-22 PROCEDURE — 700105 HCHG RX REV CODE 258: Performed by: EMERGENCY MEDICINE

## 2023-02-22 PROCEDURE — 74181 MRI ABDOMEN W/O CONTRAST: CPT

## 2023-02-22 PROCEDURE — 96365 THER/PROPH/DIAG IV INF INIT: CPT

## 2023-02-22 RX ORDER — LABETALOL HYDROCHLORIDE 5 MG/ML
10 INJECTION, SOLUTION INTRAVENOUS ONCE
Status: COMPLETED | OUTPATIENT
Start: 2023-02-22 | End: 2023-02-22

## 2023-02-22 RX ORDER — POLYETHYLENE GLYCOL 3350 17 G/17G
1 POWDER, FOR SOLUTION ORAL 2 TIMES DAILY
Status: DISCONTINUED | OUTPATIENT
Start: 2023-02-22 | End: 2023-02-25 | Stop reason: HOSPADM

## 2023-02-22 RX ORDER — OMEPRAZOLE 20 MG/1
20 CAPSULE, DELAYED RELEASE ORAL EVERY EVENING
COMMUNITY
End: 2024-03-07 | Stop reason: SDUPTHER

## 2023-02-22 RX ORDER — SODIUM CHLORIDE, SODIUM LACTATE, POTASSIUM CHLORIDE, CALCIUM CHLORIDE 600; 310; 30; 20 MG/100ML; MG/100ML; MG/100ML; MG/100ML
1000 INJECTION, SOLUTION INTRAVENOUS ONCE
Status: COMPLETED | OUTPATIENT
Start: 2023-02-22 | End: 2023-02-22

## 2023-02-22 RX ORDER — MORPHINE SULFATE 4 MG/ML
4 INJECTION INTRAVENOUS
Status: DISCONTINUED | OUTPATIENT
Start: 2023-02-22 | End: 2023-02-25

## 2023-02-22 RX ORDER — IBUPROFEN 800 MG/1
800 TABLET ORAL 3 TIMES DAILY PRN
Status: DISCONTINUED | OUTPATIENT
Start: 2023-02-25 | End: 2023-02-25 | Stop reason: HOSPADM

## 2023-02-22 RX ORDER — ACETAMINOPHEN 500 MG
500-1000 TABLET ORAL EVERY 6 HOURS PRN
COMMUNITY

## 2023-02-22 RX ORDER — FAMOTIDINE 20 MG/1
20 TABLET, FILM COATED ORAL 2 TIMES DAILY
Status: DISCONTINUED | OUTPATIENT
Start: 2023-02-22 | End: 2023-02-22

## 2023-02-22 RX ORDER — DOCUSATE SODIUM 100 MG/1
100 CAPSULE, LIQUID FILLED ORAL 2 TIMES DAILY
Status: DISCONTINUED | OUTPATIENT
Start: 2023-02-22 | End: 2023-02-25 | Stop reason: HOSPADM

## 2023-02-22 RX ORDER — AMOXICILLIN 250 MG
1 CAPSULE ORAL NIGHTLY
Status: DISCONTINUED | OUTPATIENT
Start: 2023-02-22 | End: 2023-02-25 | Stop reason: HOSPADM

## 2023-02-22 RX ORDER — KETOROLAC TROMETHAMINE 30 MG/ML
15 INJECTION, SOLUTION INTRAMUSCULAR; INTRAVENOUS EVERY 6 HOURS
Status: DISCONTINUED | OUTPATIENT
Start: 2023-02-22 | End: 2023-02-25 | Stop reason: HOSPADM

## 2023-02-22 RX ORDER — AMOXICILLIN 250 MG
1 CAPSULE ORAL
Status: DISCONTINUED | OUTPATIENT
Start: 2023-02-22 | End: 2023-02-25 | Stop reason: HOSPADM

## 2023-02-22 RX ORDER — MORPHINE SULFATE 4 MG/ML
2 INJECTION INTRAVENOUS ONCE
Status: COMPLETED | OUTPATIENT
Start: 2023-02-22 | End: 2023-02-22

## 2023-02-22 RX ORDER — ONDANSETRON 4 MG/1
4 TABLET, ORALLY DISINTEGRATING ORAL EVERY 4 HOURS PRN
Status: DISCONTINUED | OUTPATIENT
Start: 2023-02-22 | End: 2023-02-25 | Stop reason: HOSPADM

## 2023-02-22 RX ORDER — ONDANSETRON 2 MG/ML
4 INJECTION INTRAMUSCULAR; INTRAVENOUS EVERY 4 HOURS PRN
Status: DISCONTINUED | OUTPATIENT
Start: 2023-02-22 | End: 2023-02-25 | Stop reason: HOSPADM

## 2023-02-22 RX ORDER — ATENOLOL 50 MG/1
50 TABLET ORAL DAILY
Status: DISCONTINUED | OUTPATIENT
Start: 2023-02-23 | End: 2023-02-25 | Stop reason: HOSPADM

## 2023-02-22 RX ORDER — SODIUM CHLORIDE, SODIUM LACTATE, POTASSIUM CHLORIDE, CALCIUM CHLORIDE 600; 310; 30; 20 MG/100ML; MG/100ML; MG/100ML; MG/100ML
INJECTION, SOLUTION INTRAVENOUS CONTINUOUS
Status: DISCONTINUED | OUTPATIENT
Start: 2023-02-22 | End: 2023-02-24 | Stop reason: ALTCHOICE

## 2023-02-22 RX ORDER — ONDANSETRON 2 MG/ML
4 INJECTION INTRAMUSCULAR; INTRAVENOUS ONCE
Status: COMPLETED | OUTPATIENT
Start: 2023-02-22 | End: 2023-02-22

## 2023-02-22 RX ORDER — FLUOXETINE HYDROCHLORIDE 20 MG/1
20 CAPSULE ORAL DAILY
Status: DISCONTINUED | OUTPATIENT
Start: 2023-02-23 | End: 2023-02-25 | Stop reason: HOSPADM

## 2023-02-22 RX ORDER — HYDROCHLOROTHIAZIDE 25 MG/1
25 TABLET ORAL DAILY
Status: DISCONTINUED | OUTPATIENT
Start: 2023-02-23 | End: 2023-02-25 | Stop reason: HOSPADM

## 2023-02-22 RX ORDER — HYDRALAZINE HYDROCHLORIDE 20 MG/ML
10 INJECTION INTRAMUSCULAR; INTRAVENOUS ONCE
Status: COMPLETED | OUTPATIENT
Start: 2023-02-22 | End: 2023-02-22

## 2023-02-22 RX ORDER — CEFTRIAXONE 1 G/1
1000 INJECTION, POWDER, FOR SOLUTION INTRAMUSCULAR; INTRAVENOUS ONCE
Status: COMPLETED | OUTPATIENT
Start: 2023-02-22 | End: 2023-02-22

## 2023-02-22 RX ORDER — METRONIDAZOLE 500 MG/100ML
500 INJECTION, SOLUTION INTRAVENOUS EVERY 6 HOURS
Status: COMPLETED | OUTPATIENT
Start: 2023-02-22 | End: 2023-02-22

## 2023-02-22 RX ORDER — MORPHINE SULFATE 4 MG/ML
2 INJECTION INTRAVENOUS
Status: DISCONTINUED | OUTPATIENT
Start: 2023-02-22 | End: 2023-02-25

## 2023-02-22 RX ORDER — ENEMA 19; 7 G/133ML; G/133ML
1 ENEMA RECTAL
Status: DISCONTINUED | OUTPATIENT
Start: 2023-02-22 | End: 2023-02-25 | Stop reason: HOSPADM

## 2023-02-22 RX ORDER — BISACODYL 10 MG
10 SUPPOSITORY, RECTAL RECTAL
Status: DISCONTINUED | OUTPATIENT
Start: 2023-02-22 | End: 2023-02-25 | Stop reason: HOSPADM

## 2023-02-22 RX ORDER — OMEPRAZOLE 20 MG/1
20 CAPSULE, DELAYED RELEASE ORAL EVERY EVENING
Status: DISCONTINUED | OUTPATIENT
Start: 2023-02-22 | End: 2023-02-25 | Stop reason: HOSPADM

## 2023-02-22 RX ADMIN — ONDANSETRON 4 MG: 2 INJECTION INTRAMUSCULAR; INTRAVENOUS at 08:06

## 2023-02-22 RX ADMIN — MORPHINE SULFATE 2 MG: 4 INJECTION INTRAVENOUS at 08:07

## 2023-02-22 RX ADMIN — FAMOTIDINE 20 MG: 10 INJECTION, SOLUTION INTRAVENOUS at 12:35

## 2023-02-22 RX ADMIN — KETOROLAC TROMETHAMINE 15 MG: 30 INJECTION, SOLUTION INTRAMUSCULAR; INTRAVENOUS at 18:25

## 2023-02-22 RX ADMIN — CEFTRIAXONE SODIUM 1000 MG: 1 INJECTION, POWDER, FOR SOLUTION INTRAMUSCULAR; INTRAVENOUS at 09:52

## 2023-02-22 RX ADMIN — KETOROLAC TROMETHAMINE 15 MG: 30 INJECTION, SOLUTION INTRAMUSCULAR; INTRAVENOUS at 12:36

## 2023-02-22 RX ADMIN — SODIUM CHLORIDE, POTASSIUM CHLORIDE, SODIUM LACTATE AND CALCIUM CHLORIDE: 600; 310; 30; 20 INJECTION, SOLUTION INTRAVENOUS at 12:37

## 2023-02-22 RX ADMIN — METRONIDAZOLE 500 MG: 5 INJECTION, SOLUTION INTRAVENOUS at 10:03

## 2023-02-22 RX ADMIN — SODIUM CHLORIDE, POTASSIUM CHLORIDE, SODIUM LACTATE AND CALCIUM CHLORIDE 1000 ML: 600; 310; 30; 20 INJECTION, SOLUTION INTRAVENOUS at 08:06

## 2023-02-22 RX ADMIN — HYDRALAZINE HYDROCHLORIDE 10 MG: 20 INJECTION INTRAMUSCULAR; INTRAVENOUS at 08:16

## 2023-02-22 RX ADMIN — LABETALOL HYDROCHLORIDE 10 MG: 5 INJECTION, SOLUTION INTRAVENOUS at 09:02

## 2023-02-22 ASSESSMENT — PATIENT HEALTH QUESTIONNAIRE - PHQ9
2. FEELING DOWN, DEPRESSED, IRRITABLE, OR HOPELESS: NOT AT ALL
SUM OF ALL RESPONSES TO PHQ9 QUESTIONS 1 AND 2: 0
1. LITTLE INTEREST OR PLEASURE IN DOING THINGS: NOT AT ALL

## 2023-02-22 ASSESSMENT — LIFESTYLE VARIABLES
TOTAL SCORE: 0
TOTAL SCORE: 0
EVER FELT BAD OR GUILTY ABOUT YOUR DRINKING: NO
CONSUMPTION TOTAL: NEGATIVE
HOW MANY TIMES IN THE PAST YEAR HAVE YOU HAD 5 OR MORE DRINKS IN A DAY: 0
DOES PATIENT WANT TO STOP DRINKING: NO
TOTAL SCORE: 0
AVERAGE NUMBER OF DAYS PER WEEK YOU HAVE A DRINK CONTAINING ALCOHOL: 1
EVER HAD A DRINK FIRST THING IN THE MORNING TO STEADY YOUR NERVES TO GET RID OF A HANGOVER: NO
HAVE PEOPLE ANNOYED YOU BY CRITICIZING YOUR DRINKING: NO
ALCOHOL_USE: YES
ON A TYPICAL DAY WHEN YOU DRINK ALCOHOL HOW MANY DRINKS DO YOU HAVE: 1
HAVE YOU EVER FELT YOU SHOULD CUT DOWN ON YOUR DRINKING: NO

## 2023-02-22 ASSESSMENT — COGNITIVE AND FUNCTIONAL STATUS - GENERAL
MOBILITY SCORE: 24
SUGGESTED CMS G CODE MODIFIER DAILY ACTIVITY: CH
SUGGESTED CMS G CODE MODIFIER MOBILITY: CH
DAILY ACTIVITIY SCORE: 24

## 2023-02-22 ASSESSMENT — PAIN DESCRIPTION - PAIN TYPE
TYPE: ACUTE PAIN

## 2023-02-22 ASSESSMENT — FIBROSIS 4 INDEX: FIB4 SCORE: 1.27

## 2023-02-22 NOTE — ASSESSMENT & PLAN NOTE
Presented w RUQ pain  Elevated LFTs, bilirubin and lipase  US shows sludge with thickened GB wall  MRCP with dependent gallbladder sludge with minimal gallbladder wall thickening and pericholecystic fluid, mildly dilated common bile duct with no intrahepatic biliary dilatation and a  potential 1 mm nonocclusive round filling defect in the distal common duct seen on only one sequence and not confirmed on other imaging sequences.  2/23 ERCP completed with stone extraction  2/24 Lap tiffanie

## 2023-02-22 NOTE — ED PROVIDER NOTES
"ED Provider Note    CHIEF COMPLAINT  Chief Complaint   Patient presents with    Abdominal Pain     Bilateral upper quadrant pain since 0000    N/V       EXTERNAL RECORDS REVIEWED  Office visit for vaccination last year.  No recent hospital admissions.    HPI/ROS    Sherry Louis is a 72 y.o. female who presents with abdominal pain.  Started this morning.  Bilateral upper quadrant.  Was very severe.  Seems to have resolved prior to arrival.  Just fairly minimal pain in the right upper quadrant now.  Felt nauseated and vomited.  No nausea now.  Diarrheal bowel movement which seemed to improve her pain.  No lower abdominal pain.    PAST MEDICAL HISTORY   has a past medical history of Hypertension.    SURGICAL HISTORY   has a past surgical history that includes abdominal hysterectomy total.    FAMILY HISTORY  Family History   Problem Relation Age of Onset    Hypertension Father     Hypertension Maternal Grandfather        SOCIAL HISTORY  Social History     Tobacco Use    Smoking status: Former     Packs/day: 0.50     Types: Cigarettes     Quit date: 1999     Years since quittin.7    Smokeless tobacco: Never   Vaping Use    Vaping Use: Never used   Substance and Sexual Activity    Alcohol use: Yes     Comment: a bottle of wine per week    Drug use: No    Sexual activity: Yes     Partners: Male       CURRENT MEDICATIONS  Home Medications       Reviewed by Steven Arias R.N. (Registered Nurse) on 23 at 0615  Med List Status: Not Addressed     Medication Last Dose Status   atenolol (TENORMIN) 50 MG Tab  Active   FLUoxetine (PROZAC) 20 MG Cap  Active   hydroCHLOROthiazide (HYDRODIURIL) 25 MG Tab  Active   Semaglutide (WEGOVY) 0.25 MG/0.5ML Solution Auto-injector Pen-injector  Active                    ALLERGIES  Allergies   Allergen Reactions    Lisinopril Cough       PHYSICAL EXAM  VITAL SIGNS: BP (!) 153/76   Pulse 85   Temp 37 °C (98.6 °F) (Temporal)   Resp 16   Ht 1.511 m (4' 11.49\")   Wt " 79.4 kg (175 lb)   SpO2 98%   BMI 34.77 kg/m²    Constitutional: Awake and alert  HENT: Normal inspection  Eyes: Normal inspection  Neck: Grossly normal range of motion.  Cardiovascular: Normal heart rate, Normal rhythm.  Symmetric peripheral pulses.   Thorax & Lungs: No respiratory distress, No wheezing, No rales, No rhonchi, No chest tenderness.   Abdomen: Bowel sounds normal, soft, non-distended, minimal tenderness in the right quadrant  Neurologic: Grossly normal   Psychiatric: Normal for situation    DIAGNOSTIC STUDIES / PROCEDURES    LABS  Results for orders placed or performed during the hospital encounter of 02/22/23   CBC with Differential   Result Value Ref Range    WBC 11.0 (H) 4.8 - 10.8 K/uL    RBC 4.42 4.20 - 5.40 M/uL    Hemoglobin 13.5 12.0 - 16.0 g/dL    Hematocrit 42.1 37.0 - 47.0 %    MCV 95.2 81.4 - 97.8 fL    MCH 30.5 27.0 - 33.0 pg    MCHC 32.1 (L) 33.6 - 35.0 g/dL    RDW 45.6 35.9 - 50.0 fL    Platelet Count 239 164 - 446 K/uL    MPV 10.7 9.0 - 12.9 fL    Neutrophils-Polys 91.10 (H) 44.00 - 72.00 %    Lymphocytes 3.60 (L) 22.00 - 41.00 %    Monocytes 4.60 0.00 - 13.40 %    Eosinophils 0.10 0.00 - 6.90 %    Basophils 0.10 0.00 - 1.80 %    Immature Granulocytes 0.50 0.00 - 0.90 %    Nucleated RBC 0.00 /100 WBC    Neutrophils (Absolute) 10.02 (H) 2.00 - 7.15 K/uL    Lymphs (Absolute) 0.40 (L) 1.00 - 4.80 K/uL    Monos (Absolute) 0.51 0.00 - 0.85 K/uL    Eos (Absolute) 0.01 0.00 - 0.51 K/uL    Baso (Absolute) 0.01 0.00 - 0.12 K/uL    Immature Granulocytes (abs) 0.05 0.00 - 0.11 K/uL    NRBC (Absolute) 0.00 K/uL   Complete Metabolic Panel   Result Value Ref Range    Sodium 138 135 - 145 mmol/L    Potassium 3.7 3.6 - 5.5 mmol/L    Chloride 102 96 - 112 mmol/L    Co2 23 20 - 33 mmol/L    Anion Gap 13.0 7.0 - 16.0    Glucose 167 (H) 65 - 99 mg/dL    Bun 16 8 - 22 mg/dL    Creatinine 0.71 0.50 - 1.40 mg/dL    Calcium 9.1 8.5 - 10.5 mg/dL    AST(SGOT) 752 (H) 12 - 45 U/L    ALT(SGPT) 560 (H) 2 - 50  U/L    Alkaline Phosphatase 195 (H) 30 - 99 U/L    Total Bilirubin 3.3 (H) 0.1 - 1.5 mg/dL    Albumin 4.2 3.2 - 4.9 g/dL    Total Protein 7.4 6.0 - 8.2 g/dL    Globulin 3.2 1.9 - 3.5 g/dL    A-G Ratio 1.3 g/dL   Lipase   Result Value Ref Range    Lipase 537 (H) 11 - 82 U/L   CORRECTED CALCIUM   Result Value Ref Range    Correct Calcium 8.9 8.5 - 10.5 mg/dL   ESTIMATED GFR   Result Value Ref Range    GFR (CKD-EPI) 90 >60 mL/min/1.73 m 2   EKG   Result Value Ref Range    Report       [facility] Emergency Dept.    Test Date:  2023  Pt Name:    CRISTHIAN LUCIANO                  Department: ER  MRN:        [id number]                      Room:  Gender:     Female                       Technician: [id number]  :        [id number]                   Requested By:ER TRIAGE PROTOCOL  Order #:    [id number]                    Reading MD:    Measurements  Intervals                                Axis  Rate:       69                           P:          115  CT:         156                          QRS:        39  QRSD:       86                           T:          67  QT:         426  QTc:        457    Interpretive Statements  Sinus rhythm  Compared to ECG 2019 09:55:57  Left ventricular hypertrophy no longer present     ekG interpreted by me    RADIOLOGY  I have independently interpreted the diagnostic imaging associated with this visit and am waiting the final reading from the radiologist.   My preliminary interpretation is a follows: Ultrasound with gallstones  Radiologist interpretation:   US-RUQ   Final Result      1.  Gallbladder sludge, gallbladder wall thickening and dilation of the common bile duct up to 1 cm. Findings could be seen in the setting of acute cholecystitis. A HIDA scan can be obtained for further evaluation if clinically indicated.   2.  Hepatic steatosis and/or diffuse hepatocellular disease.      [id number]    (Results Pending)         COURSE & MEDICAL DECISION MAKING    ED  Observation Status? Yes; I am placing the patient in to an observation status due to a diagnostic uncertainty as well as therapeutic intensity. Patient placed in observation status at 700 AM, 2/22/2023.     Observation plan is as follows: Pain labs.  Monitor symptoms.  Determine need for imaging or hospital admission.    Upon Reevaluation, the patient's condition has: not improved; and will be escalated to hospitalization.    Patient discharged from ED Observation status at 11 AM (Time) 2/22/23 (Date).     INITIAL ASSESSMENT, COURSE AND PLAN  Care Narrative: Patient presents with upper abdominal pain.  Pain has largely resolved although still slight tenderness in the right upper quadrant.  EKG on arrival was unremarkable.  Unlikely ACS.  Differential includes biliary colic, gastritis, biliary obstruction, ischemia, diverticulitis etc.  Work-up was initiated.  Ordered morphine and Zofran.  Patient declined the morphine.  Hydrate with LR.    Data demonstrates elevated LFTs and lipase.  Repeat abdominal exam minimal tenderness.  Ultrasound was ordered.  Pain almost completely resolved.    Ultrasound demonstrates findings compatible with cholecystitis but she does have a dilated common bile duct.  Consult general surgery.    Discussed case with Dr. shepherd.  She will evaluate the patient.  Given possibility of cholecystitis I ordered ceftriaxone and Flagyl.  Patient does not meet sepsis criteria.  Hemodynamically stable.    HYDRATION: Based on the patient's presentation of Other pancreatitis the patient was given IV fluids. IV Hydration was used because oral hydration failed due to will surgery. Upon recheck following hydration, the patient was stable.        DISPOSITION AND DISCUSSIONS  I have discussed management of the patient with the following physicians and MAYNOR's: Lisset, general surgery    FINAL DIAGNOSIS  Cholecystitis  Pancreatitis  Rule out biliary obstruction       Electronically signed by: Orlando Santoro,  M.D., 2/22/2023 9:19 AM

## 2023-02-22 NOTE — ED NOTES
Pharmacy Medication Reconciliation      ~Medication reconciliation updated and complete per patient at bedside  ~Allergies have been verified and updated   ~No oral ABX within the last 30 days  ~Patient home pharmacy :  [facility]

## 2023-02-22 NOTE — ED NOTES
Rounded with patient. Pt states their pain has improved since arriving to ED. Pt told they will be roomed ASAP.

## 2023-02-22 NOTE — PROGRESS NOTES
Patient here from ED.   Declines pain, N/V at this time.   Patient educated on use of call light and verbalizes understanding.

## 2023-02-22 NOTE — CONSULTS
"    CHIEF COMPLAINT: Right upper quadrant pain     HISTORY OF PRESENT ILLNESS: The patient is a 72 year-old White woman who presents to the Emergency Department a 1- day history of severe right upper quadrant  abdominal pain. The pain is associated with nausea and vomiting. She denies this type of pain in the past.   The patient denies any recent or intercurrent illness. The patient has undergone hysterectomy..    PAST MEDICAL HISTORY:  has a past medical history of Hypertension.    PAST SURGICAL HISTORY:  has a past surgical history that includes abdominal hysterectomy total.    ALLERGIES:   Allergies   Allergen Reactions    Lisinopril Cough       CURRENT MEDICATIONS:    Home Medications       Reviewed by Steven Arias R.N. (Registered Nurse) on 02/22/23 at 0615  Med List Status: Not Addressed     Medication Last Dose Status   atenolol (TENORMIN) 50 MG Tab  Active   FLUoxetine (PROZAC) 20 MG Cap  Active   hydroCHLOROthiazide (HYDRODIURIL) 25 MG Tab  Active   Semaglutide (WEGOVY) 0.25 MG/0.5ML Solution Auto-injector Pen-injector  Active                    FAMILY HISTORY: family history includes Hypertension in her father and maternal grandfather.    SOCIAL HISTORY:  reports that she quit smoking about 23 years ago. Her smoking use included cigarettes. She smoked an average of .5 packs per day. She has never used smokeless tobacco. She reports current alcohol use. She reports that she does not use drugs.    REVIEW OF SYSTEMS: Comprehensive review of systems is negative with the exception of the aforementioned HPI, PMH, and PSH bullets in accordance with CMS guidelines.    PHYSICAL EXAMINATION:      Constitutional:     Vital Signs: BP (!) 153/76   Pulse 85   Temp 37 °C (98.6 °F) (Temporal)   Resp 16   Ht 1.511 m (4' 11.49\")   Wt 79.4 kg (175 lb)   SpO2 98%    General Appearance: appears stated age.  HEENT: The pupils are equal, round, and reactive to light bilaterally.  The sclera are icteric. Nares and " oropharynx are clear.   Neck: Supple.   Respiratory:   Inspection: Unlabored respirations, no intercostal retractions, paradoxical motion, or accessory muscle use.     Cardiovascular:   Inspection: The skin is warm.     Abdomen:  Inspection: Abdominal inspection reveals  umbilical hernia that is reducible .   Palpation: Palpation is remarkable for mild tenderness in the right upper quadrant  region.    Extremities:   Examination of the upper and lower extremities demonstrates no cyanosis edema or clubbing.  Neurologic:     No focal deficits noted.    LABORATORY VALUES:   Recent Labs     02/22/23  0620   WBC 11.0*   RBC 4.42   HEMOGLOBIN 13.5   HEMATOCRIT 42.1   MCV 95.2   MCH 30.5   MCHC 32.1*   RDW 45.6   PLATELETCT 239   MPV 10.7     Recent Labs     02/22/23  0620   SODIUM 138   POTASSIUM 3.7   CHLORIDE 102   CO2 23   GLUCOSE 167*   BUN 16   CREATININE 0.71   CALCIUM 9.1     Recent Labs     02/22/23  0620   ASTSGOT 752*   ALTSGPT 560*   TBILIRUBIN 3.3*   ALKPHOSPHAT 195*   GLOBULIN 3.2            IMAGING:   US-RUQ   Final Result      1.  Gallbladder sludge, gallbladder wall thickening and dilation of the common bile duct up to 1 cm. Findings could be seen in the setting of acute cholecystitis. A HIDA scan can be obtained for further evaluation if clinically indicated.   2.  Hepatic steatosis and/or diffuse hepatocellular disease.          ASSESSMENT AND PLAN:     Gallstone pancreatitis  Assessment & Plan  Presented w RUQ pain  Elevated LFTs, bilirubin and lipase  US shows sludge with thickened GB wall  Will get MRCP due to elevated LFTs with no stones            ____________________________________     Jayleen Laura M.D.    DD: 2/22/2023  9:10 AM

## 2023-02-22 NOTE — ED NOTES
Report to to Jasmyn COMER [place] [address].  Pt transported to [place] [address] with 1 bag of belongings.

## 2023-02-22 NOTE — ED TRIAGE NOTES
"Chief Complaint   Patient presents with    Abdominal Pain     Bilateral upper quadrant pain since 0000    N/V     Patient to triage in  accompanied by family for the above complaint. Patient states that the pain is a dull pain and states that she only had one moment where the pain radiated at all, otherwise it's localized to bilateral upper quadrant pain.    Protocol ordered.    Pt is alert and oriented, speaking in full sentences, follows commands and responds appropriately to questions. Resp are even and unlabored.      Pt placed in lobby. Pt educated on triage process. Pt encouraged to alert staff for any changes.     Patient and staff wearing appropriate PPE.    BP (!) 209/124   Pulse 65   Temp 35.8 °C (96.5 °F) (Temporal)   Resp 18   Ht 1.511 m (4' 11.49\")   Wt 79.4 kg (175 lb)   SpO2 96%    "

## 2023-02-23 ENCOUNTER — APPOINTMENT (OUTPATIENT)
Dept: RADIOLOGY | Facility: MEDICAL CENTER | Age: 73
DRG: 417 | End: 2023-02-23
Attending: INTERNAL MEDICINE
Payer: MEDICARE

## 2023-02-23 ENCOUNTER — ANESTHESIA (OUTPATIENT)
Dept: SURGERY | Facility: MEDICAL CENTER | Age: 73
DRG: 417 | End: 2023-02-23
Payer: MEDICARE

## 2023-02-23 ENCOUNTER — ANESTHESIA EVENT (OUTPATIENT)
Dept: SURGERY | Facility: MEDICAL CENTER | Age: 73
DRG: 417 | End: 2023-02-23
Payer: MEDICARE

## 2023-02-23 LAB
ALBUMIN SERPL BCP-MCNC: 3.2 G/DL (ref 3.2–4.9)
ALBUMIN/GLOB SERPL: 1.1 G/DL
ALP SERPL-CCNC: 155 U/L (ref 30–99)
ALT SERPL-CCNC: 457 U/L (ref 2–50)
ANION GAP SERPL CALC-SCNC: 12 MMOL/L (ref 7–16)
AST SERPL-CCNC: 365 U/L (ref 12–45)
BASOPHILS # BLD AUTO: 0.1 % (ref 0–1.8)
BASOPHILS # BLD: 0.01 K/UL (ref 0–0.12)
BILIRUB SERPL-MCNC: 6.4 MG/DL (ref 0.1–1.5)
BUN SERPL-MCNC: 20 MG/DL (ref 8–22)
CALCIUM ALBUM COR SERPL-MCNC: 8.9 MG/DL (ref 8.5–10.5)
CALCIUM SERPL-MCNC: 8.3 MG/DL (ref 8.5–10.5)
CHLORIDE SERPL-SCNC: 102 MMOL/L (ref 96–112)
CO2 SERPL-SCNC: 24 MMOL/L (ref 20–33)
CREAT SERPL-MCNC: 1.05 MG/DL (ref 0.5–1.4)
EOSINOPHIL # BLD AUTO: 0.09 K/UL (ref 0–0.51)
EOSINOPHIL NFR BLD: 1.2 % (ref 0–6.9)
ERYTHROCYTE [DISTWIDTH] IN BLOOD BY AUTOMATED COUNT: 47.5 FL (ref 35.9–50)
GFR SERPLBLD CREATININE-BSD FMLA CKD-EPI: 56 ML/MIN/1.73 M 2
GLOBULIN SER CALC-MCNC: 2.8 G/DL (ref 1.9–3.5)
GLUCOSE SERPL-MCNC: 116 MG/DL (ref 65–99)
HCT VFR BLD AUTO: 34.8 % (ref 37–47)
HGB BLD-MCNC: 11.3 G/DL (ref 12–16)
IMM GRANULOCYTES # BLD AUTO: 0.03 K/UL (ref 0–0.11)
IMM GRANULOCYTES NFR BLD AUTO: 0.4 % (ref 0–0.9)
INR PPP: 1.1 (ref 0.87–1.13)
LIPASE SERPL-CCNC: 19 U/L (ref 11–82)
LYMPHOCYTES # BLD AUTO: 0.7 K/UL (ref 1–4.8)
LYMPHOCYTES NFR BLD: 9.3 % (ref 22–41)
MCH RBC QN AUTO: 30.9 PG (ref 27–33)
MCHC RBC AUTO-ENTMCNC: 32.5 G/DL (ref 33.6–35)
MCV RBC AUTO: 95.1 FL (ref 81.4–97.8)
MONOCYTES # BLD AUTO: 0.54 K/UL (ref 0–0.85)
MONOCYTES NFR BLD AUTO: 7.2 % (ref 0–13.4)
NEUTROPHILS # BLD AUTO: 6.15 K/UL (ref 2–7.15)
NEUTROPHILS NFR BLD: 81.8 % (ref 44–72)
NRBC # BLD AUTO: 0 K/UL
NRBC BLD-RTO: 0 /100 WBC
PLATELET # BLD AUTO: 181 K/UL (ref 164–446)
PMV BLD AUTO: 10.7 FL (ref 9–12.9)
POTASSIUM SERPL-SCNC: 3.1 MMOL/L (ref 3.6–5.5)
PROT SERPL-MCNC: 6 G/DL (ref 6–8.2)
PROTHROMBIN TIME: 14.1 SEC (ref 12–14.6)
RBC # BLD AUTO: 3.66 M/UL (ref 4.2–5.4)
SODIUM SERPL-SCNC: 138 MMOL/L (ref 135–145)
WBC # BLD AUTO: 7.5 K/UL (ref 4.8–10.8)

## 2023-02-23 PROCEDURE — 160028 HCHG SURGERY MINUTES - 1ST 30 MINS LEVEL 3: Performed by: INTERNAL MEDICINE

## 2023-02-23 PROCEDURE — 700101 HCHG RX REV CODE 250

## 2023-02-23 PROCEDURE — 160002 HCHG RECOVERY MINUTES (STAT): Performed by: INTERNAL MEDICINE

## 2023-02-23 PROCEDURE — 43262 ENDO CHOLANGIOPANCREATOGRAPH: CPT | Performed by: INTERNAL MEDICINE

## 2023-02-23 PROCEDURE — 85610 PROTHROMBIN TIME: CPT

## 2023-02-23 PROCEDURE — 99232 SBSQ HOSP IP/OBS MODERATE 35: CPT | Performed by: NURSE PRACTITIONER

## 2023-02-23 PROCEDURE — 700105 HCHG RX REV CODE 258: Performed by: SURGERY

## 2023-02-23 PROCEDURE — 160035 HCHG PACU - 1ST 60 MINS PHASE I: Performed by: INTERNAL MEDICINE

## 2023-02-23 PROCEDURE — A9270 NON-COVERED ITEM OR SERVICE: HCPCS | Performed by: NURSE PRACTITIONER

## 2023-02-23 PROCEDURE — 700117 HCHG RX CONTRAST REV CODE 255: Performed by: INTERNAL MEDICINE

## 2023-02-23 PROCEDURE — 700102 HCHG RX REV CODE 250 W/ 637 OVERRIDE(OP): Performed by: NURSE PRACTITIONER

## 2023-02-23 PROCEDURE — C1769 GUIDE WIRE: HCPCS | Performed by: INTERNAL MEDICINE

## 2023-02-23 PROCEDURE — 700111 HCHG RX REV CODE 636 W/ 250 OVERRIDE (IP): Performed by: ANESTHESIOLOGY

## 2023-02-23 PROCEDURE — 700101 HCHG RX REV CODE 250: Performed by: ANESTHESIOLOGY

## 2023-02-23 PROCEDURE — 160036 HCHG PACU - EA ADDL 30 MINS PHASE I: Performed by: INTERNAL MEDICINE

## 2023-02-23 PROCEDURE — 160009 HCHG ANES TIME/MIN: Performed by: INTERNAL MEDICINE

## 2023-02-23 PROCEDURE — 770001 HCHG ROOM/CARE - MED/SURG/GYN PRIV*

## 2023-02-23 PROCEDURE — 700111 HCHG RX REV CODE 636 W/ 250 OVERRIDE (IP): Performed by: SURGERY

## 2023-02-23 PROCEDURE — 80053 COMPREHEN METABOLIC PANEL: CPT

## 2023-02-23 PROCEDURE — 160048 HCHG OR STATISTICAL LEVEL 1-5: Performed by: INTERNAL MEDICINE

## 2023-02-23 PROCEDURE — 99100 ANES PT EXTEME AGE<1 YR&>70: CPT | Performed by: ANESTHESIOLOGY

## 2023-02-23 PROCEDURE — C1889 IMPLANT/INSERT DEVICE, NOC: HCPCS | Performed by: INTERNAL MEDICINE

## 2023-02-23 PROCEDURE — 43264 ERCP REMOVE DUCT CALCULI: CPT | Performed by: INTERNAL MEDICINE

## 2023-02-23 PROCEDURE — 700105 HCHG RX REV CODE 258: Performed by: INTERNAL MEDICINE

## 2023-02-23 PROCEDURE — 85025 COMPLETE CBC W/AUTO DIFF WBC: CPT

## 2023-02-23 PROCEDURE — 36415 COLL VENOUS BLD VENIPUNCTURE: CPT

## 2023-02-23 PROCEDURE — 00732 ANES UPR GI NDSC PX ERCP: CPT | Performed by: ANESTHESIOLOGY

## 2023-02-23 PROCEDURE — 0FC98ZZ EXTIRPATION OF MATTER FROM COMMON BILE DUCT, VIA NATURAL OR ARTIFICIAL OPENING ENDOSCOPIC: ICD-10-PCS | Performed by: INTERNAL MEDICINE

## 2023-02-23 PROCEDURE — 74328 X-RAY BILE DUCT ENDOSCOPY: CPT

## 2023-02-23 PROCEDURE — 83690 ASSAY OF LIPASE: CPT

## 2023-02-23 RX ORDER — MIDAZOLAM HYDROCHLORIDE 1 MG/ML
INJECTION INTRAMUSCULAR; INTRAVENOUS PRN
Status: DISCONTINUED | OUTPATIENT
Start: 2023-02-23 | End: 2023-02-23 | Stop reason: SURG

## 2023-02-23 RX ORDER — ROCURONIUM BROMIDE 10 MG/ML
INJECTION, SOLUTION INTRAVENOUS PRN
Status: DISCONTINUED | OUTPATIENT
Start: 2023-02-23 | End: 2023-02-23 | Stop reason: SURG

## 2023-02-23 RX ORDER — SODIUM CHLORIDE, SODIUM LACTATE, POTASSIUM CHLORIDE, CALCIUM CHLORIDE 600; 310; 30; 20 MG/100ML; MG/100ML; MG/100ML; MG/100ML
INJECTION, SOLUTION INTRAVENOUS CONTINUOUS
Status: DISCONTINUED | OUTPATIENT
Start: 2023-02-23 | End: 2023-02-23 | Stop reason: HOSPADM

## 2023-02-23 RX ORDER — ONDANSETRON 2 MG/ML
4 INJECTION INTRAMUSCULAR; INTRAVENOUS
Status: DISCONTINUED | OUTPATIENT
Start: 2023-02-23 | End: 2023-02-23 | Stop reason: HOSPADM

## 2023-02-23 RX ORDER — ONDANSETRON 2 MG/ML
INJECTION INTRAMUSCULAR; INTRAVENOUS PRN
Status: DISCONTINUED | OUTPATIENT
Start: 2023-02-23 | End: 2023-02-23 | Stop reason: SURG

## 2023-02-23 RX ORDER — LIDOCAINE HYDROCHLORIDE 20 MG/ML
INJECTION, SOLUTION EPIDURAL; INFILTRATION; INTRACAUDAL; PERINEURAL PRN
Status: DISCONTINUED | OUTPATIENT
Start: 2023-02-23 | End: 2023-02-23 | Stop reason: SURG

## 2023-02-23 RX ORDER — LABETALOL HYDROCHLORIDE 5 MG/ML
10 INJECTION, SOLUTION INTRAVENOUS ONCE
Status: DISCONTINUED | OUTPATIENT
Start: 2023-02-23 | End: 2023-02-23

## 2023-02-23 RX ORDER — LABETALOL HYDROCHLORIDE 5 MG/ML
INJECTION, SOLUTION INTRAVENOUS
Status: COMPLETED
Start: 2023-02-23 | End: 2023-02-23

## 2023-02-23 RX ORDER — DIPHENHYDRAMINE HYDROCHLORIDE 50 MG/ML
12.5 INJECTION INTRAMUSCULAR; INTRAVENOUS
Status: DISCONTINUED | OUTPATIENT
Start: 2023-02-23 | End: 2023-02-23 | Stop reason: HOSPADM

## 2023-02-23 RX ORDER — LABETALOL HYDROCHLORIDE 5 MG/ML
10 INJECTION, SOLUTION INTRAVENOUS
Status: DISCONTINUED | OUTPATIENT
Start: 2023-02-23 | End: 2023-02-24 | Stop reason: HOSPADM

## 2023-02-23 RX ORDER — MEPERIDINE HYDROCHLORIDE 25 MG/ML
6.25 INJECTION INTRAMUSCULAR; INTRAVENOUS; SUBCUTANEOUS
Status: DISCONTINUED | OUTPATIENT
Start: 2023-02-23 | End: 2023-02-23 | Stop reason: HOSPADM

## 2023-02-23 RX ORDER — HALOPERIDOL 5 MG/ML
1 INJECTION INTRAMUSCULAR
Status: DISCONTINUED | OUTPATIENT
Start: 2023-02-23 | End: 2023-02-23 | Stop reason: HOSPADM

## 2023-02-23 RX ORDER — OXYCODONE HCL 5 MG/5 ML
5 SOLUTION, ORAL ORAL
Status: DISCONTINUED | OUTPATIENT
Start: 2023-02-23 | End: 2023-02-23 | Stop reason: HOSPADM

## 2023-02-23 RX ORDER — OXYCODONE HCL 5 MG/5 ML
10 SOLUTION, ORAL ORAL
Status: DISCONTINUED | OUTPATIENT
Start: 2023-02-23 | End: 2023-02-23 | Stop reason: HOSPADM

## 2023-02-23 RX ORDER — SODIUM CHLORIDE, SODIUM LACTATE, POTASSIUM CHLORIDE, CALCIUM CHLORIDE 600; 310; 30; 20 MG/100ML; MG/100ML; MG/100ML; MG/100ML
INJECTION, SOLUTION INTRAVENOUS CONTINUOUS
Status: ACTIVE | OUTPATIENT
Start: 2023-02-23 | End: 2023-02-23

## 2023-02-23 RX ORDER — DEXAMETHASONE SODIUM PHOSPHATE 4 MG/ML
INJECTION, SOLUTION INTRA-ARTICULAR; INTRALESIONAL; INTRAMUSCULAR; INTRAVENOUS; SOFT TISSUE PRN
Status: DISCONTINUED | OUTPATIENT
Start: 2023-02-23 | End: 2023-02-23 | Stop reason: SURG

## 2023-02-23 RX ADMIN — SUGAMMADEX 200 MG: 100 INJECTION, SOLUTION INTRAVENOUS at 14:53

## 2023-02-23 RX ADMIN — SODIUM CHLORIDE, POTASSIUM CHLORIDE, SODIUM LACTATE AND CALCIUM CHLORIDE: 600; 310; 30; 20 INJECTION, SOLUTION INTRAVENOUS at 14:32

## 2023-02-23 RX ADMIN — HYDROCHLOROTHIAZIDE 25 MG: 25 TABLET ORAL at 05:25

## 2023-02-23 RX ADMIN — FLUOXETINE 20 MG: 20 CAPSULE ORAL at 05:24

## 2023-02-23 RX ADMIN — PROPOFOL 150 MG: 10 INJECTION, EMULSION INTRAVENOUS at 14:35

## 2023-02-23 RX ADMIN — KETOROLAC TROMETHAMINE 15 MG: 30 INJECTION, SOLUTION INTRAMUSCULAR; INTRAVENOUS at 05:25

## 2023-02-23 RX ADMIN — LIDOCAINE HYDROCHLORIDE 100 MG: 20 INJECTION, SOLUTION EPIDURAL; INFILTRATION; INTRACAUDAL at 14:35

## 2023-02-23 RX ADMIN — MIDAZOLAM HYDROCHLORIDE 2 MG: 1 INJECTION, SOLUTION INTRAMUSCULAR; INTRAVENOUS at 14:32

## 2023-02-23 RX ADMIN — FENTANYL CITRATE 50 MCG: 50 INJECTION, SOLUTION INTRAMUSCULAR; INTRAVENOUS at 14:35

## 2023-02-23 RX ADMIN — DEXAMETHASONE SODIUM PHOSPHATE 4 MG: 4 INJECTION, SOLUTION INTRA-ARTICULAR; INTRALESIONAL; INTRAMUSCULAR; INTRAVENOUS; SOFT TISSUE at 14:47

## 2023-02-23 RX ADMIN — ATENOLOL 50 MG: 50 TABLET ORAL at 05:24

## 2023-02-23 RX ADMIN — SODIUM CHLORIDE, POTASSIUM CHLORIDE, SODIUM LACTATE AND CALCIUM CHLORIDE: 600; 310; 30; 20 INJECTION, SOLUTION INTRAVENOUS at 05:26

## 2023-02-23 RX ADMIN — LABETALOL HYDROCHLORIDE 10 MG: 5 INJECTION, SOLUTION INTRAVENOUS at 15:13

## 2023-02-23 RX ADMIN — ROCURONIUM BROMIDE 50 MG: 10 INJECTION, SOLUTION INTRAVENOUS at 14:35

## 2023-02-23 RX ADMIN — ONDANSETRON 4 MG: 2 INJECTION INTRAMUSCULAR; INTRAVENOUS at 14:47

## 2023-02-23 RX ADMIN — OMEPRAZOLE 20 MG: 20 CAPSULE, DELAYED RELEASE ORAL at 17:55

## 2023-02-23 ASSESSMENT — ENCOUNTER SYMPTOMS
CHILLS: 0
NAUSEA: 1
ABDOMINAL PAIN: 1
SORE THROAT: 0
NERVOUS/ANXIOUS: 0
VOMITING: 1
CONSTIPATION: 0
WEAKNESS: 0
DIARRHEA: 0
VOMITING: 0
COUGH: 0
HEADACHES: 0
FLANK PAIN: 0
FALLS: 0
MYALGIAS: 0
BLOOD IN STOOL: 0
NAUSEA: 0
SHORTNESS OF BREATH: 0
ABDOMINAL PAIN: 0
FEVER: 0

## 2023-02-23 ASSESSMENT — COGNITIVE AND FUNCTIONAL STATUS - GENERAL
DAILY ACTIVITIY SCORE: 24
MOBILITY SCORE: 24
SUGGESTED CMS G CODE MODIFIER DAILY ACTIVITY: CH
SUGGESTED CMS G CODE MODIFIER MOBILITY: CH

## 2023-02-23 ASSESSMENT — PAIN DESCRIPTION - PAIN TYPE
TYPE: ACUTE PAIN
TYPE: ACUTE PAIN
TYPE: SURGICAL PAIN
TYPE: ACUTE PAIN

## 2023-02-23 ASSESSMENT — LIFESTYLE VARIABLES: SUBSTANCE_ABUSE: 0

## 2023-02-23 NOTE — ANESTHESIA PREPROCEDURE EVALUATION
Case: [id number] Date/Time: 02/23/23 1445    Procedure: ERCP (ENDOSCOPIC RETROGRADE CHOLANGIOPANCREATOGRAPHY)  (Esophagus)    Anesthesia type: General    Pre-op diagnosis: choledocholithiasis    Location: [facility] ROOM 26 / SURGERY SAME DAY [facility]    Surgeons: Landon Andrade M.D.      73yo female with HTN and gallstone pancreatitis for ERCP    Relevant Problems   CARDIAC   (positive) Essential hypertension       Physical Exam    Airway   Mallampati: II  TM distance: >3 FB  Neck ROM: full       Cardiovascular - normal exam  Rhythm: regular  Rate: normal  (-) murmur     Dental - normal exam           Pulmonary - normal exam  Breath sounds clear to auscultation     Abdominal    Neurological - normal exam                 Anesthesia Plan    ASA 2       Plan - general       Airway plan will be ETT          Induction: intravenous    Postoperative Plan: Postoperative administration of opioids is intended.    Pertinent diagnostic labs and testing reviewed    Informed Consent:    Anesthetic plan and risks discussed with patient.    Use of blood products discussed with: patient whom consented to blood products.

## 2023-02-23 NOTE — ANESTHESIA PROCEDURE NOTES
Airway    Date/Time: 2/23/2023 2:36 PM  Performed by: Cathleen Quesada M.D.  Authorized by: Cathleen Quesada M.D.     Location:  OR  Urgency:  Elective  Difficult Airway: No    Indications for Airway Management:  Anesthesia      Spontaneous Ventilation: absent    Sedation Level:  Deep  Preoxygenated: Yes    Patient Position:  Sniffing  Mask Difficulty Assessment:  1 - vent by mask  Final Airway Type:  Endotracheal airway  Final Endotracheal Airway:  ETT  Cuffed: Yes    Technique Used for Successful ETT Placement:  Direct laryngoscopy    Insertion Site:  Oral  Blade Type:  Zora  Laryngoscope Blade/Videolaryngoscope Blade Size:  3  ETT Size (mm):  7.0  Measured from:  Teeth  ETT to Teeth (cm):  21  Placement Verified by: auscultation and capnometry    Cormack-Lehane Classification:  Grade IIa - partial view of glottis  Number of Attempts at Approach:  1  Number of Other Approaches Attempted:  0

## 2023-02-23 NOTE — CARE PLAN
The patient is Stable - Low risk of patient condition declining or worsening    Shift Goals  Clinical Goals: Pain Management, MRI  Patient Goals: MRI    Progress made toward(s) clinical / shift goals:  Medicated per MAR, Patient MRI being completed now

## 2023-02-23 NOTE — ANESTHESIA TIME REPORT
Anesthesia Start and Stop Event Times     Date Time Event    2/23/2023 1416 Ready for Procedure     1432 Anesthesia Start     1501 Anesthesia Stop        Responsible Staff  02/23/23    Name Role Begin End    Cathleen Quesada M.D. Anesth 1432 1501        Overtime Reason:  no overtime (within assigned shift)    Comments:

## 2023-02-23 NOTE — PROGRESS NOTES
MRCP with dependent gallbladder sludge with minimal gallbladder wall thickening and pericholecystic fluid, mildly dilated common bile duct with no intrahepatic biliary dilatation and a  potential 1 mm nonocclusive round filling defect in the distal common duct seen on only one sequence and not confirmed on other imaging sequences.    Home meds resumed.    Diet for tonight then NPO at midnight.

## 2023-02-23 NOTE — OR NURSING
1500 received patient from OR. Report from Anesthesiologist and OR RN. Patient on 4L at 95 % O2. VSS. Monitor connected. No airway in place. S/P ERCP. Patient HTN, 10mg labetolol ordered by Dr. Quesada.    8276 Handoff to Anais COMER

## 2023-02-23 NOTE — PROGRESS NOTES
"    DATE: 2/23/2023    Hospital Day 2  choledocholithiasis .    INTERVAL EVENTS:  MRCP with stone  LFT's trend down, total bilirubin trend up  GI consulted for ERCP  Plan lap tiffanie once ERCP completed    REVIEW OF SYSTEMS:  Review of Systems   Constitutional:  Negative for chills and fever.   Respiratory:  Negative for shortness of breath.    Gastrointestinal:  Negative for abdominal pain, nausea and vomiting.   Genitourinary:         Voiding   Musculoskeletal:  Negative for myalgias.     PHYSICAL EXAMINATION:  Vital Signs: Blood Pressure (Abnormal) 149/72   Pulse 67   Temperature 36.1 °C (97 °F) (Temporal)   Respiration 17   Height 1.511 m (4' 11.49\")   Weight 79.4 kg (175 lb)   Oxygen Saturation 91%   Physical Exam  Vitals and nursing note reviewed.   Constitutional:       Appearance: She is not ill-appearing.   Eyes:      Conjunctiva/sclera: Conjunctivae normal.   Cardiovascular:      Rate and Rhythm: Normal rate.   Pulmonary:      Effort: Pulmonary effort is normal.   Abdominal:      General: There is no distension.      Palpations: Abdomen is soft.      Tenderness: There is no abdominal tenderness. There is no guarding.   Skin:     General: Skin is warm and dry.   Neurological:      Mental Status: She is alert and oriented to person, place, and time.       LABORATORY VALUES:   Recent Labs     02/22/23  0620 02/23/23  0302   WBC 11.0* 7.5   RBC 4.42 3.66*   HEMOGLOBIN 13.5 11.3*   HEMATOCRIT 42.1 34.8*   MCV 95.2 95.1   MCH 30.5 30.9   MCHC 32.1* 32.5*   RDW 45.6 47.5   PLATELETCT 239 181   MPV 10.7 10.7     Recent Labs     02/22/23  0620 02/23/23  0302   SODIUM 138 138   POTASSIUM 3.7 3.1*   CHLORIDE 102 102   CO2 23 24   GLUCOSE 167* 116*   BUN 16 20   CREATININE 0.71 1.05   CALCIUM 9.1 8.3*     Recent Labs     02/22/23  0620 02/23/23  0302   ASTSGOT 752* 365*   ALTSGPT 560* 457*   TBILIRUBIN 3.3* 6.4*   ALKPHOSPHAT 195* 155*   GLOBULIN 3.2 2.8            IMAGING:   [id number]/O   Final Result      1.  " There is dependent gallbladder sludge with minimal gallbladder wall thickening and pericholecystic fluid.   2.  Mildly dilated common bile duct with no intrahepatic biliary dilatation.   3.  In the appropriate clinical setting this could represent acute cholecystitis.   4.  There is a potential 1 mm nonocclusive round filling defect in the distal common duct seen on only one sequence and not confirmed on other imaging sequences.   5.  There is a trace of nonspecific peripancreatic fluid.      US-RUQ   Final Result      1.  Gallbladder sludge, gallbladder wall thickening and dilation of the common bile duct up to 1 cm. Findings could be seen in the setting of acute cholecystitis. A HIDA scan can be obtained for further evaluation if clinically indicated.   2.  Hepatic steatosis and/or diffuse hepatocellular disease.          Core Measures & Quality Metrics    ASSESSMENT AND PLAN:   * Gallstone pancreatitis- (present on admission)  Assessment & Plan  Presented w RUQ pain  Elevated LFTs, bilirubin and lipase  US shows sludge with thickened GB wall  MRCP with dependent gallbladder sludge with minimal gallbladder wall thickening and pericholecystic fluid, mildly dilated common bile duct with no intrahepatic biliary dilatation and a  potential 1 mm nonocclusive round filling defect in the distal common duct seen on only one sequence and not confirmed on other imaging sequences.  2/23 GI consult for ERCP  Plan lap tiffanie once ERCP completed        Discussed patient condition with RN, Patient, and general surgery, Dr. Laura.

## 2023-02-23 NOTE — OR SURGEON
ERCP operative note    PreOp Diagnosis: Bile duct obstruction suspected choledocholithiasis      PostOp Diagnosis: Same      Procedure(s):  ERCP (ENDOSCOPIC RETROGRADE CHOLANGIOPANCREATOGRAPHY)  - Wound Class: Clean Contaminated  SPHINCTEROTOMY - Wound Class: Clean Contaminated  ERCP, WITH CALCULUS REMOVAL FROM BILE OR PANCREATIC DUCT - Wound Class: Clean Contaminated    Surgeon(s):  Landon Andrade M.D.    Anesthesiologist/Type of Anesthesia:  Anesthesiologist: Cathleen Quesada M.D./General    Surgical Staff:  Endoscopy Technician: Bhavani Saez  Radiology Technologist: Robert Lopez  Endoscopy Nurse: Vicky Grove R.N.; Joe Jacques RMARCEL    Specimens removed if any:  * No specimens in log *    Dr. Andrade  [facility]  [facility] [place]  [phone]    ERCP with: Biliary sphincterotomy    Bile duct stone extraction    Radiologic interpretation of static and dynamic fluoroscopic images    Indication: Gallstone pancreatitis, elevated liver enzymes, bile duct obstruction with suspected choledocholithiasis    Sedation: GA    Findings:    Ampulla     Unremarkable appearance    Pancreatic duct     Neither injected nor cannulated    CBD     Normal course and caliber with normal intrahepatic biliary tree     5 small filling defects consistent with stones     Therapeutics    10 mm biliary sphincterotomy with both papillotome over covered wire    9-12 mm occlusion balloon stone extraction until duct clear    Plan:  Follow liver enzymes    Cholecystectomy recommended    Further recommendations per primary hospital GI service      Procedure detail:    Prior to procedure, informed consent was obtained.  Risks, benefits, alternatives, including but not limited to risk of bleeding, infection, perforation, adverse reaction to sedating medicine, failure to identify pathology, pancreatitis and death were explained to the patient who accepted all risks.    Patient was prepped in the prone position after  intubation and sedation was provided by anesthesia.    Scope tip of the Olympus flexible side-viewing TJF duodena scope was passed to the level of the biliary ampulla in the short position after the gastric pool was suctioned dry.  The biliary ampulla was under a fold but this was easily pushed away.  An Olympus clever cut bow papillotome with a 0.025 revolution wire was utilized for selective cannulation of the common bile duct.  This was achieved on the very first attempt and the wire passed along the expected course of the bile duct.  This was followed with the papillotome and aspiration was positive for bile.  Injection of contrast demonstrated normal course and caliber of the bile duct and normal intrahepatic biliary tree but there were 5 small filling defects scattered throughout the duct consistent with stones.  The wire was left in place in the left intrahepatic biliary tree and a 10 mm biliary sphincterotomy was performed with the bow papillotome over the covered wire.  A small stone flowed from the duct.  The tome was removed off of the wire which was left in place and a 9-12 mm occlusion balloon was exchanged over the wire passed to the common hepatic duct proximal to the most proximal stone inflated to 12 mm and withdrawn down the duct with delivery of stones.  The 12 mm balloon pulled with relative ease through the ampulla.  3 additional sweeps were made all of which were negative for further stones or debris.  The procedure was deemed complete.  The scope was withdrawn after air and liquid resection.  Patient was sent to recovery without immediate complications.        2/23/2023 2:57 PM Landon Andrade M.D.

## 2023-02-23 NOTE — PROGRESS NOTES
Bedside report received.  Assessment complete.  A&O x 4. Patient calls appropriately.  Patient ambulates with  assist.   Patient has 0/10 pain.  Denies N&V. Patient is currently NPO awaiting procedure.  + void  Patient denies SOB.  Patient pleasant with staff and resting in bed.  Review plan with of care with patient. Call light and personal belongings within reach. Hourly rounding in place. All needs met at this time.

## 2023-02-23 NOTE — PROGRESS NOTES
Received report from previous shift RN  Assessment complete.  A&O x 4. Patient calls appropriately.  Patient ambulates with standby assist. Bed alarm off.   Patient has 0/10 pain. Pain managed with prescribed medications.  Denies N&V. Tolerating regular diet to be NPO @ 0000.  + void, + flatus, - BM.  Patient denies SOB.  SCD's on.    Review plan of care with patient. Call light and personal belongings with in reach. Hourly rounding in place. All needs met at this time.

## 2023-02-23 NOTE — CONSULTS
Date of Consultation:  2/23/2023    Patient: : Sherry Louis  MRN: [id number]    Referring Physician:  Jayleen Laura M.D.     GI:SOLANGE Moreau     Reason for Consultation: gallbladder pancreatitis    History of Present Illness:   This is a 72-year-old female with a past medical history of hypertension, previous tobacco use who presented to [facility] on 2/22/2023 with bilateral upper quadrant abdominal pain.  She reports pain was acute onset early in the day on 2/22/2023 described as twisting and was associated with nausea, vomiting.  Patient reports pain was ongoing/progressive over the next 5 hours which prompted her to seek care in the emergency department.  In the emergency department, leukocytosis 11, transaminitis noted with , , alk phos 195, total bilirubin 3.3.  Total bilirubin subsequently increased overnight to 6.4.  Initial lipase 537.  Right upper quadrant ultrasound was obtained showing gallbladder sludge, gallbladder wall thickening and dilation of the common bile duct up to 1 cm concerning for acute cholecystitis.  Subsequent MRCP showed dependent gallbladder sludge with minimal gallbladder wall thickening and.  Cholecystic fluid.  Additionally, there was mild dilation of common bile duct without intrahepatic biliary dilatation.  General surgery was consulted with plan for cholecystectomy after ERCP.    Otherwise the patient is doing fine without complaints of fever/ chills/ weight loss/ appetite change/ dysphagia/ odynophagia/ heartburn/ bloating/ constipation/ melena/ hematochezia.    Tobacco use: Previous tobacco user, quit in 1999  Alcohol use: Patient reports drinking a single glass of wine daily  Illicit drug use: Denies    Last EGD: None prior  Last colonoscopy: Patient reports last colonoscopy was in ~2001  NSAID/ASA use: Occasional naproxen but none recently  Anticoagulation use: Denies      Past Medical History:   Diagnosis Date    Hypertension           Past Surgical History:   Procedure Laterality Date    ABDOMINAL HYSTERECTOMY TOTAL         Family History   Problem Relation Age of Onset    Hypertension Father     Hypertension Maternal Grandfather        Social History     Socioeconomic History    Marital status:    Tobacco Use    Smoking status: Former     Packs/day: 0.50     Types: Cigarettes     Quit date: 1999     Years since quittin.7    Smokeless tobacco: Never   Vaping Use    Vaping Use: Never used   Substance and Sexual Activity    Alcohol use: Yes     Comment: a bottle of wine per week    Drug use: No    Sexual activity: Yes     Partners: Male       Review of systems:  Review of Systems   Constitutional:  Negative for chills, fever and malaise/fatigue.   HENT:  Negative for congestion and sore throat.    Respiratory:  Negative for cough and shortness of breath.    Cardiovascular:  Negative for chest pain and leg swelling.   Gastrointestinal:  Positive for abdominal pain, nausea and vomiting. Negative for blood in stool, constipation, diarrhea and melena.   Genitourinary:  Negative for dysuria and flank pain.   Musculoskeletal:  Negative for falls and myalgias.   Neurological:  Negative for weakness and headaches.   Psychiatric/Behavioral:  Negative for substance abuse. The patient is not nervous/anxious.    All other systems reviewed and are negative.      Physical Exam:  Vitals:    23 2038 23 0015 23 0507 23 0818   BP: 127/61 (!) 146/66 138/66 (!) 149/72   Pulse: 78 85 70 67   Resp: 17 18 17 17   Temp: 36.7 °C (98.1 °F) 36.6 °C (97.9 °F) 36.3 °C (97.3 °F) 36.1 °C (97 °F)   TempSrc: Temporal Temporal Temporal Temporal   SpO2: 93% 93% 92% 91%   Weight:       Height:           Physical Exam  Vitals and nursing note reviewed.   Constitutional:       General: She is not in acute distress.     Appearance: She is obese. She is not toxic-appearing.   HENT:      Head: Normocephalic.      Nose: Nose normal. No  congestion.      Mouth/Throat:      Mouth: Mucous membranes are moist.      Pharynx: Oropharynx is clear. No oropharyngeal exudate.   Eyes:      General: No scleral icterus.     Extraocular Movements: Extraocular movements intact.      Conjunctiva/sclera: Conjunctivae normal.   Cardiovascular:      Rate and Rhythm: Normal rate and regular rhythm.      Pulses: Normal pulses.      Heart sounds: Normal heart sounds. No murmur heard.  Pulmonary:      Effort: Pulmonary effort is normal. No respiratory distress.      Breath sounds: Normal breath sounds. No wheezing.   Abdominal:      General: Bowel sounds are normal. There is no distension.      Palpations: Abdomen is soft.      Tenderness: There is abdominal tenderness (Mild RUQ tenderness with palpation). There is no guarding.   Musculoskeletal:      Right lower leg: No edema.      Left lower leg: No edema.   Skin:     General: Skin is warm and dry.      Capillary Refill: Capillary refill takes less than 2 seconds.      Coloration: Skin is jaundiced (Mild). Skin is not pale.   Neurological:      General: No focal deficit present.      Mental Status: She is alert. Mental status is at baseline.   Psychiatric:         Mood and Affect: Mood normal.         Behavior: Behavior normal.         Thought Content: Thought content normal.         Judgment: Judgment normal.         Labs:  Recent Labs     02/22/23  0620 02/23/23  0302   WBC 11.0* 7.5   RBC 4.42 3.66*   HEMOGLOBIN 13.5 11.3*   HEMATOCRIT 42.1 34.8*   MCV 95.2 95.1   MCH 30.5 30.9   MCHC 32.1* 32.5*   RDW 45.6 47.5   PLATELETCT 239 181   MPV 10.7 10.7     Recent Labs     02/22/23  0620 02/23/23  0302   SODIUM 138 138   POTASSIUM 3.7 3.1*   CHLORIDE 102 102   CO2 23 24   GLUCOSE 167* 116*   BUN 16 20           Recent Labs     02/22/23  0620 02/23/23  0302   ASTSGOT 752* 365*   ALTSGPT 560* 457*   TBILIRUBIN 3.3* 6.4*   ALKPHOSPHAT 195* 155*   GLOBULIN 3.2 2.8         Imaging:  [id number]-V/O  Narrative: 2/22/2023 4:37  PM    HISTORY/REASON FOR EXAM:  Gallbladder sludge and wall thickening and common bile duct dilatation on recent ultrasound exam. History of abdominal pain    TECHNIQUE/EXAM DESCRIPTION:  EXAM: Multiplanar, multisequence MRI of the abdomen performed using MRCP protocol. Additional 3-D MRCP images were obtained.  IV CONTRAST: None.  MAGNET STRENGTH: Systems[employer] 1.5 Cecile MRI scanner.    COMPARISON: RUQ ultrasound same day    FINDINGS:  Study is limited by motion artifact.    Gallbladder: There is dependent intermediate signal in the gallbladder which would correlate with the sludge seen on the recent ultrasound. There is pericholecystic fluid with possible minimal wall thickening.    Biliary system: The maximum diameter of the common bile duct is 6.4 mm. Duct tapers near the ampulla with no obstructing stone. There is a potential nonocclusive 1 mm round filling defect in the distal aspect of the common duct on the coronal thin   sections which is not confirmed on the axial imaging or on the 3-D MRCP images.    Liver: The visualized portions of the liver demonstrate no focal abnormalities.    Pancreas: Normal signal. No ductal dilatation. Ductal anatomy is conventional without evidence of divisum. There is a trace of peripancreatic fluid    Adrenal glands: Unremarkable.    Spleen: Unremarkable.    Kidneys: No hydronephrosis. There are simple left renal cyst requiring no imaging follow-up per ACR guidelines.    Ascites: ascites.    Lymph nodes: No adenopathy.    Bowel:  There is a small hiatal hernia.    Miscellaneous: There is a fat-containing umbilical hernia with no evidence of inflammation.  Impression: 1.  There is dependent gallbladder sludge with minimal gallbladder wall thickening and pericholecystic fluid.  2.  Mildly dilated common bile duct with no intrahepatic biliary dilatation.  3.  In the appropriate clinical setting this could represent acute cholecystitis.  4.  There is a potential 1 mm nonocclusive  round filling defect in the distal common duct seen on only one sequence and not confirmed on other imaging sequences.  5.  There is a trace of nonspecific peripancreatic fluid.  US-RUQ  Narrative: 2/22/2023 8:20 AM    HISTORY/REASON FOR EXAM:  Abnormal Labs  Abdominal pain    TECHNIQUE/EXAM DESCRIPTION AND NUMBER OF VIEWS:  Real-time sonography of the liver and biliary tree.    COMPARISON: None    FINDINGS:  The liver is normal in contour. There is no evidence of solid mass lesion. The liver measures 15.68 cm. There is diffusely increased hepatic parenchymal echogenicity.    The gallbladder demonstrates dependent nonshadowing. There is no evidence of cholelithiasis.  The gallbladder wall thickness is increased measuring 4.40 mm. There is no pericholecystic fluid.  The common duct is mildly dilated measuring 10.00 mm.    The visualized pancreas is unremarkable.  The visualized aorta is normal in caliber.    Intrahepatic IVC is patent.    The portal vein is patent with hepatopetal flow. The MPV measures 1.01 cm.    The right kidney measures 10.02 cm.    There is no ascites.  Impression: 1.  Gallbladder sludge, gallbladder wall thickening and dilation of the common bile duct up to 1 cm. Findings could be seen in the setting of acute cholecystitis. A HIDA scan can be obtained for further evaluation if clinically indicated.  2.  Hepatic steatosis and/or diffuse hepatocellular disease.            Impressions:  Gallstone pancreatitis  Transaminitis  Hyperbilirubinemia  Hypertension    MDM:  This is a pleasant 72-year-old female presenting with gallstone pancreatitis.  Diagnostic findings discussed with patient as well as further evaluation and possible treatment with ERCP with stent placement.  She is agreeable to proceed.  She is been n.p.o. since midnight.    Recommendations:  Maintain n.p.o. status  Stat INR ordered  Plan for ERCP with stent placement today.  Trend LFTs postprocedurally    Discussed with patient,  , Kim Salgado APRN, Dr. Andrade, Dr. Rai        This note was generated using voice recognition software which has a small chance of producing errors of grammar and possibly content. I have made every reasonable attempt to find and correct any obvious errors, but expect that some may not be found prior to finalization of this note.

## 2023-02-23 NOTE — OR NURSING
1524 Report received from Shannon COMER and care of patient assumed at this time. Denies any pain or nausea, states she is comfortable.     1531 Handoff report to T4 SOULEYMANE Vines; all questions answered. Patient transport request placed at this time. 02 weaned to room air; patient maintaining spo2 >90%.     1605 transported back to room via gurney with portable 02, accompanied by RN. Upon arrival to room, RN present, assisted patient to ambulate to bed.

## 2023-02-23 NOTE — PROGRESS NOTES
GI ATTENDING:    Patient seen and examined.  Chart reviewed.  Therapeutic endoscopic retrograde cholangiopancreatography (ERCP) explained at length.    Patient expressed understanding and request to proceed    Consenting person was given an opportunity to ask questions and discuss other options.  Risks including but not limited to pancreatitis, contrast reaction, stent migration and/or occlusion, perforation, infection, bleeding, missed lesion(s), cardiac and/or pulmonary event, aspiration, stroke, possible need for surgery and/or interventional radiology, hospitalization possibly prolonged, discomfort, unsuccessful and/or incomplete procedure, indefinite diagnosis, ineffective therapy and/or persistent symptoms, possible need for repeat procedures and/or additional testings, damage to adjacent organs and/or vascular structures, medication reaction, disability, death, and other adverse events possibly life-threatening.  Discussion was undertaken with Layman's terms.  Consenting person stated understanding and acceptance of these risks, and wished to proceed.  Informed consent was given in clear state of mind.

## 2023-02-23 NOTE — PROGRESS NOTES
4 Eyes Skin Assessment Completed    Head WDL  Ears WDL  Nose WDL  Mouth WDL  Neck WDL  Breast/Chest WDL  Shoulder Blades WDL  Spine WDL  (R) Arm/Elbow/Hand WDL  (L) Arm/Elbow/Hand WDL  Abdomen WDL  Groin WDL  Scrotum/Coccyx/Buttocks WDL  (R) Leg WDL  (L) Leg WDL  (R) Heel/Foot/Toe WDL  (L) Heel/Foot/Toe WDL    Interventions In Place Pillows    Possible Skin Injury No    Pictures Uploaded Into Epic N/A  Wound Consult Placed N/A  RN Wound Prevention Protocol Ordered No

## 2023-02-23 NOTE — CARE PLAN
The patient is Stable - Low risk of patient condition declining or worsening    Shift Goals  Clinical Goals: NPO @ 0000  Patient Goals: Rest    Progress made toward(s) clinical / shift goals:  pt pain medicated per MAR, anti-nausea meds ordered in MAR.      Problem: Pain - Standard  Goal: Alleviation of pain or a reduction in pain to the patient’s comfort goal  Outcome: Progressing     Problem: Knowledge Deficit - Standard  Goal: Patient and family/care givers will demonstrate understanding of plan of care, disease process/condition, diagnostic tests and medications  Outcome: Progressing

## 2023-02-23 NOTE — DISCHARGE PLANNING
"[facility]/SCP TCN chart review completed. Collaborated with WALE Moore. The most current review of medical record, knowledge of pt's PLOF and social support, LACE+ score of 22, 6 clicks scores of 24 mobility were considered.      Pt seen at bedside. Introduced TCN program. Provided education regarding post acute levels of care. Discussed [facility]TH/SCP plan benefits (Meds to Beds, medical uber and [facility] transitional care). Pt verbalizes understanding. She is anticipating dc to home with outpatient follow ups once medically cleared. She declines [facility] services post dc as well. Noted high 6 clicks as above and pt also reports she has been ambulatory/functionally close to baseline and reports no functional concerns with dc to home once medically cleared. She reports no concerns with transportation for dc home or follow ups.     Would note per APRN note from today 2/23 @1041,  that pt is awaiting \"GI consulted for ERCP. Plan lap tiffanie once ERCP completed\". TCN discussed with pt and that TCN will continue to monitor for possible dc planning needs based on outcomes of GI/interventions and pt in agreement.     Given aforementioned, no additional provider requests at this time and no choice needed. TCN will continue to follow and collaborate with discharge planning team as additional post acute needs arise. Thank you.     Completed today:  Choice obtained: none needed; see above  [facility] referral not sent; pt declines, low LACE and pt prefers outpatient if needed   "

## 2023-02-24 ENCOUNTER — ANESTHESIA (OUTPATIENT)
Dept: SURGERY | Facility: MEDICAL CENTER | Age: 73
DRG: 417 | End: 2023-02-24
Payer: MEDICARE

## 2023-02-24 ENCOUNTER — ANESTHESIA EVENT (OUTPATIENT)
Dept: SURGERY | Facility: MEDICAL CENTER | Age: 73
DRG: 417 | End: 2023-02-24
Payer: MEDICARE

## 2023-02-24 PROBLEM — K80.50 CHOLEDOCHOLITHIASIS: Status: ACTIVE | Noted: 2023-02-22

## 2023-02-24 LAB
ALBUMIN SERPL BCP-MCNC: 3.2 G/DL (ref 3.2–4.9)
ALBUMIN/GLOB SERPL: 1.1 G/DL
ALP SERPL-CCNC: 158 U/L (ref 30–99)
ALT SERPL-CCNC: 378 U/L (ref 2–50)
ANION GAP SERPL CALC-SCNC: 10 MMOL/L (ref 7–16)
AST SERPL-CCNC: 232 U/L (ref 12–45)
BASOPHILS # BLD AUTO: 0.1 % (ref 0–1.8)
BASOPHILS # BLD: 0.01 K/UL (ref 0–0.12)
BILIRUB SERPL-MCNC: 2.4 MG/DL (ref 0.1–1.5)
BUN SERPL-MCNC: 16 MG/DL (ref 8–22)
CALCIUM ALBUM COR SERPL-MCNC: 9.3 MG/DL (ref 8.5–10.5)
CALCIUM SERPL-MCNC: 8.7 MG/DL (ref 8.5–10.5)
CHLORIDE SERPL-SCNC: 105 MMOL/L (ref 96–112)
CO2 SERPL-SCNC: 25 MMOL/L (ref 20–33)
CREAT SERPL-MCNC: 0.82 MG/DL (ref 0.5–1.4)
EOSINOPHIL # BLD AUTO: 0 K/UL (ref 0–0.51)
EOSINOPHIL NFR BLD: 0 % (ref 0–6.9)
ERYTHROCYTE [DISTWIDTH] IN BLOOD BY AUTOMATED COUNT: 46 FL (ref 35.9–50)
GFR SERPLBLD CREATININE-BSD FMLA CKD-EPI: 75 ML/MIN/1.73 M 2
GLOBULIN SER CALC-MCNC: 3 G/DL (ref 1.9–3.5)
GLUCOSE SERPL-MCNC: 153 MG/DL (ref 65–99)
HCT VFR BLD AUTO: 34 % (ref 37–47)
HGB BLD-MCNC: 11.2 G/DL (ref 12–16)
IMM GRANULOCYTES # BLD AUTO: 0.04 K/UL (ref 0–0.11)
IMM GRANULOCYTES NFR BLD AUTO: 0.6 % (ref 0–0.9)
LYMPHOCYTES # BLD AUTO: 0.49 K/UL (ref 1–4.8)
LYMPHOCYTES NFR BLD: 6.8 % (ref 22–41)
MCH RBC QN AUTO: 30.8 PG (ref 27–33)
MCHC RBC AUTO-ENTMCNC: 32.9 G/DL (ref 33.6–35)
MCV RBC AUTO: 93.4 FL (ref 81.4–97.8)
MONOCYTES # BLD AUTO: 0.33 K/UL (ref 0–0.85)
MONOCYTES NFR BLD AUTO: 4.6 % (ref 0–13.4)
NEUTROPHILS # BLD AUTO: 6.3 K/UL (ref 2–7.15)
NEUTROPHILS NFR BLD: 87.9 % (ref 44–72)
NRBC # BLD AUTO: 0 K/UL
NRBC BLD-RTO: 0 /100 WBC
PATHOLOGY CONSULT NOTE: NORMAL
PLATELET # BLD AUTO: 183 K/UL (ref 164–446)
PMV BLD AUTO: 10.9 FL (ref 9–12.9)
POTASSIUM SERPL-SCNC: 3.2 MMOL/L (ref 3.6–5.5)
PROT SERPL-MCNC: 6.2 G/DL (ref 6–8.2)
RBC # BLD AUTO: 3.64 M/UL (ref 4.2–5.4)
SODIUM SERPL-SCNC: 140 MMOL/L (ref 135–145)
WBC # BLD AUTO: 7.2 K/UL (ref 4.8–10.8)

## 2023-02-24 PROCEDURE — 47562 LAPAROSCOPIC CHOLECYSTECTOMY: CPT | Mod: AS | Performed by: NURSE PRACTITIONER

## 2023-02-24 PROCEDURE — A9270 NON-COVERED ITEM OR SERVICE: HCPCS | Performed by: ANESTHESIOLOGY

## 2023-02-24 PROCEDURE — 160002 HCHG RECOVERY MINUTES (STAT): Performed by: SURGERY

## 2023-02-24 PROCEDURE — 160035 HCHG PACU - 1ST 60 MINS PHASE I: Performed by: SURGERY

## 2023-02-24 PROCEDURE — 88304 TISSUE EXAM BY PATHOLOGIST: CPT

## 2023-02-24 PROCEDURE — 47562 LAPAROSCOPIC CHOLECYSTECTOMY: CPT | Performed by: SURGERY

## 2023-02-24 PROCEDURE — 700111 HCHG RX REV CODE 636 W/ 250 OVERRIDE (IP): Performed by: SURGERY

## 2023-02-24 PROCEDURE — 160048 HCHG OR STATISTICAL LEVEL 1-5: Performed by: SURGERY

## 2023-02-24 PROCEDURE — 700102 HCHG RX REV CODE 250 W/ 637 OVERRIDE(OP): Performed by: ANESTHESIOLOGY

## 2023-02-24 PROCEDURE — 700105 HCHG RX REV CODE 258: Performed by: ANESTHESIOLOGY

## 2023-02-24 PROCEDURE — 160029 HCHG SURGERY MINUTES - 1ST 30 MINS LEVEL 4: Performed by: SURGERY

## 2023-02-24 PROCEDURE — 700111 HCHG RX REV CODE 636 W/ 250 OVERRIDE (IP): Performed by: ANESTHESIOLOGY

## 2023-02-24 PROCEDURE — 99100 ANES PT EXTEME AGE<1 YR&>70: CPT | Performed by: ANESTHESIOLOGY

## 2023-02-24 PROCEDURE — 0WQF4ZZ REPAIR ABDOMINAL WALL, PERCUTANEOUS ENDOSCOPIC APPROACH: ICD-10-PCS | Performed by: SURGERY

## 2023-02-24 PROCEDURE — A9270 NON-COVERED ITEM OR SERVICE: HCPCS | Performed by: NURSE PRACTITIONER

## 2023-02-24 PROCEDURE — 700101 HCHG RX REV CODE 250: Performed by: ANESTHESIOLOGY

## 2023-02-24 PROCEDURE — 0FT44ZZ RESECTION OF GALLBLADDER, PERCUTANEOUS ENDOSCOPIC APPROACH: ICD-10-PCS | Performed by: SURGERY

## 2023-02-24 PROCEDURE — 700101 HCHG RX REV CODE 250: Performed by: SURGERY

## 2023-02-24 PROCEDURE — 700102 HCHG RX REV CODE 250 W/ 637 OVERRIDE(OP): Performed by: NURSE PRACTITIONER

## 2023-02-24 PROCEDURE — 99233 SBSQ HOSP IP/OBS HIGH 50: CPT | Mod: 57 | Performed by: SURGERY

## 2023-02-24 PROCEDURE — 160041 HCHG SURGERY MINUTES - EA ADDL 1 MIN LEVEL 4: Performed by: SURGERY

## 2023-02-24 PROCEDURE — 160009 HCHG ANES TIME/MIN: Performed by: SURGERY

## 2023-02-24 PROCEDURE — 85025 COMPLETE CBC W/AUTO DIFF WBC: CPT

## 2023-02-24 PROCEDURE — 80053 COMPREHEN METABOLIC PANEL: CPT

## 2023-02-24 PROCEDURE — 160036 HCHG PACU - EA ADDL 30 MINS PHASE I: Performed by: SURGERY

## 2023-02-24 PROCEDURE — 00790 ANES IPER UPR ABD NOS: CPT | Performed by: ANESTHESIOLOGY

## 2023-02-24 PROCEDURE — 770001 HCHG ROOM/CARE - MED/SURG/GYN PRIV*

## 2023-02-24 RX ORDER — HALOPERIDOL 5 MG/ML
1 INJECTION INTRAMUSCULAR
Status: DISCONTINUED | OUTPATIENT
Start: 2023-02-24 | End: 2023-02-24 | Stop reason: HOSPADM

## 2023-02-24 RX ORDER — KETOROLAC TROMETHAMINE 30 MG/ML
INJECTION, SOLUTION INTRAMUSCULAR; INTRAVENOUS PRN
Status: DISCONTINUED | OUTPATIENT
Start: 2023-02-24 | End: 2023-02-24 | Stop reason: SURG

## 2023-02-24 RX ORDER — SODIUM CHLORIDE, SODIUM LACTATE, POTASSIUM CHLORIDE, CALCIUM CHLORIDE 600; 310; 30; 20 MG/100ML; MG/100ML; MG/100ML; MG/100ML
INJECTION, SOLUTION INTRAVENOUS
Status: DISCONTINUED | OUTPATIENT
Start: 2023-02-24 | End: 2023-02-24 | Stop reason: SURG

## 2023-02-24 RX ORDER — OXYCODONE HCL 5 MG/5 ML
10 SOLUTION, ORAL ORAL
Status: DISCONTINUED | OUTPATIENT
Start: 2023-02-24 | End: 2023-02-24 | Stop reason: HOSPADM

## 2023-02-24 RX ORDER — LABETALOL HYDROCHLORIDE 5 MG/ML
INJECTION, SOLUTION INTRAVENOUS PRN
Status: DISCONTINUED | OUTPATIENT
Start: 2023-02-24 | End: 2023-02-24 | Stop reason: SURG

## 2023-02-24 RX ORDER — DEXAMETHASONE SODIUM PHOSPHATE 4 MG/ML
INJECTION, SOLUTION INTRA-ARTICULAR; INTRALESIONAL; INTRAMUSCULAR; INTRAVENOUS; SOFT TISSUE PRN
Status: DISCONTINUED | OUTPATIENT
Start: 2023-02-24 | End: 2023-02-24 | Stop reason: SURG

## 2023-02-24 RX ORDER — BUPIVACAINE HYDROCHLORIDE AND EPINEPHRINE 2.5; 5 MG/ML; UG/ML
INJECTION, SOLUTION EPIDURAL; INFILTRATION; INTRACAUDAL; PERINEURAL
Status: DISCONTINUED | OUTPATIENT
Start: 2023-02-24 | End: 2023-02-24 | Stop reason: HOSPADM

## 2023-02-24 RX ORDER — HYDRALAZINE HYDROCHLORIDE 20 MG/ML
5 INJECTION INTRAMUSCULAR; INTRAVENOUS
Status: DISCONTINUED | OUTPATIENT
Start: 2023-02-24 | End: 2023-02-24 | Stop reason: HOSPADM

## 2023-02-24 RX ORDER — ACETAMINOPHEN 500 MG
1000 TABLET ORAL ONCE
Status: COMPLETED | OUTPATIENT
Start: 2023-02-24 | End: 2023-02-24

## 2023-02-24 RX ORDER — EPHEDRINE SULFATE 50 MG/ML
5 INJECTION, SOLUTION INTRAVENOUS
Status: DISCONTINUED | OUTPATIENT
Start: 2023-02-24 | End: 2023-02-24 | Stop reason: HOSPADM

## 2023-02-24 RX ORDER — DIPHENHYDRAMINE HYDROCHLORIDE 50 MG/ML
12.5 INJECTION INTRAMUSCULAR; INTRAVENOUS
Status: DISCONTINUED | OUTPATIENT
Start: 2023-02-24 | End: 2023-02-24 | Stop reason: HOSPADM

## 2023-02-24 RX ORDER — GLYCOPYRROLATE 0.2 MG/ML
INJECTION INTRAMUSCULAR; INTRAVENOUS PRN
Status: DISCONTINUED | OUTPATIENT
Start: 2023-02-24 | End: 2023-02-24 | Stop reason: SURG

## 2023-02-24 RX ORDER — ONDANSETRON 2 MG/ML
INJECTION INTRAMUSCULAR; INTRAVENOUS PRN
Status: DISCONTINUED | OUTPATIENT
Start: 2023-02-24 | End: 2023-02-24 | Stop reason: SURG

## 2023-02-24 RX ORDER — CEFAZOLIN SODIUM 1 G/3ML
INJECTION, POWDER, FOR SOLUTION INTRAMUSCULAR; INTRAVENOUS PRN
Status: DISCONTINUED | OUTPATIENT
Start: 2023-02-24 | End: 2023-02-24 | Stop reason: SURG

## 2023-02-24 RX ORDER — HYDROMORPHONE HYDROCHLORIDE 1 MG/ML
0.4 INJECTION, SOLUTION INTRAMUSCULAR; INTRAVENOUS; SUBCUTANEOUS
Status: DISCONTINUED | OUTPATIENT
Start: 2023-02-24 | End: 2023-02-24 | Stop reason: HOSPADM

## 2023-02-24 RX ORDER — HYDROMORPHONE HYDROCHLORIDE 2 MG/ML
INJECTION, SOLUTION INTRAMUSCULAR; INTRAVENOUS; SUBCUTANEOUS PRN
Status: DISCONTINUED | OUTPATIENT
Start: 2023-02-24 | End: 2023-02-24 | Stop reason: SURG

## 2023-02-24 RX ORDER — HYDRALAZINE HYDROCHLORIDE 20 MG/ML
INJECTION INTRAMUSCULAR; INTRAVENOUS PRN
Status: DISCONTINUED | OUTPATIENT
Start: 2023-02-24 | End: 2023-02-24 | Stop reason: SURG

## 2023-02-24 RX ORDER — HYDROMORPHONE HYDROCHLORIDE 1 MG/ML
0.1 INJECTION, SOLUTION INTRAMUSCULAR; INTRAVENOUS; SUBCUTANEOUS
Status: DISCONTINUED | OUTPATIENT
Start: 2023-02-24 | End: 2023-02-24 | Stop reason: HOSPADM

## 2023-02-24 RX ORDER — SODIUM CHLORIDE, SODIUM LACTATE, POTASSIUM CHLORIDE, CALCIUM CHLORIDE 600; 310; 30; 20 MG/100ML; MG/100ML; MG/100ML; MG/100ML
INJECTION, SOLUTION INTRAVENOUS CONTINUOUS
Status: DISCONTINUED | OUTPATIENT
Start: 2023-02-24 | End: 2023-02-24 | Stop reason: HOSPADM

## 2023-02-24 RX ORDER — OXYCODONE HCL 5 MG/5 ML
5 SOLUTION, ORAL ORAL
Status: DISCONTINUED | OUTPATIENT
Start: 2023-02-24 | End: 2023-02-24 | Stop reason: HOSPADM

## 2023-02-24 RX ORDER — ONDANSETRON 2 MG/ML
4 INJECTION INTRAMUSCULAR; INTRAVENOUS
Status: DISCONTINUED | OUTPATIENT
Start: 2023-02-24 | End: 2023-02-24 | Stop reason: HOSPADM

## 2023-02-24 RX ORDER — LIDOCAINE HYDROCHLORIDE 20 MG/ML
INJECTION, SOLUTION EPIDURAL; INFILTRATION; INTRACAUDAL; PERINEURAL PRN
Status: DISCONTINUED | OUTPATIENT
Start: 2023-02-24 | End: 2023-02-24 | Stop reason: SURG

## 2023-02-24 RX ORDER — ROCURONIUM BROMIDE 10 MG/ML
INJECTION, SOLUTION INTRAVENOUS PRN
Status: DISCONTINUED | OUTPATIENT
Start: 2023-02-24 | End: 2023-02-24 | Stop reason: SURG

## 2023-02-24 RX ORDER — HYDROMORPHONE HYDROCHLORIDE 1 MG/ML
0.2 INJECTION, SOLUTION INTRAMUSCULAR; INTRAVENOUS; SUBCUTANEOUS
Status: DISCONTINUED | OUTPATIENT
Start: 2023-02-24 | End: 2023-02-24 | Stop reason: HOSPADM

## 2023-02-24 RX ADMIN — FENTANYL CITRATE 100 MCG: 50 INJECTION, SOLUTION INTRAMUSCULAR; INTRAVENOUS at 10:01

## 2023-02-24 RX ADMIN — PROPOFOL 150 MG: 10 INJECTION, EMULSION INTRAVENOUS at 10:01

## 2023-02-24 RX ADMIN — ROCURONIUM BROMIDE 50 MG: 10 INJECTION, SOLUTION INTRAVENOUS at 10:01

## 2023-02-24 RX ADMIN — LIDOCAINE HYDROCHLORIDE 100 MG: 20 INJECTION, SOLUTION EPIDURAL; INFILTRATION; INTRACAUDAL at 10:01

## 2023-02-24 RX ADMIN — KETOROLAC TROMETHAMINE 30 MG: 30 INJECTION, SOLUTION INTRAMUSCULAR at 10:43

## 2023-02-24 RX ADMIN — HYDRALAZINE HYDROCHLORIDE 10 MG: 20 INJECTION INTRAMUSCULAR; INTRAVENOUS at 10:26

## 2023-02-24 RX ADMIN — SUGAMMADEX 200 MG: 100 INJECTION, SOLUTION INTRAVENOUS at 10:45

## 2023-02-24 RX ADMIN — CEFAZOLIN 2 G: 330 INJECTION, POWDER, FOR SOLUTION INTRAMUSCULAR; INTRAVENOUS at 10:01

## 2023-02-24 RX ADMIN — LABETALOL HYDROCHLORIDE 5 MG: 5 INJECTION, SOLUTION INTRAVENOUS at 10:30

## 2023-02-24 RX ADMIN — HYDRALAZINE HYDROCHLORIDE 10 MG: 20 INJECTION INTRAMUSCULAR; INTRAVENOUS at 10:29

## 2023-02-24 RX ADMIN — ONDANSETRON 4 MG: 2 INJECTION INTRAMUSCULAR; INTRAVENOUS at 10:43

## 2023-02-24 RX ADMIN — ACETAMINOPHEN 1000 MG: 500 TABLET ORAL at 10:15

## 2023-02-24 RX ADMIN — KETOROLAC TROMETHAMINE 15 MG: 30 INJECTION, SOLUTION INTRAMUSCULAR; INTRAVENOUS at 18:51

## 2023-02-24 RX ADMIN — DEXAMETHASONE SODIUM PHOSPHATE 4 MG: 4 INJECTION, SOLUTION INTRA-ARTICULAR; INTRALESIONAL; INTRAMUSCULAR; INTRAVENOUS; SOFT TISSUE at 10:01

## 2023-02-24 RX ADMIN — GLYCOPYRROLATE 0.2 MG: 0.2 INJECTION INTRAMUSCULAR; INTRAVENOUS at 10:14

## 2023-02-24 RX ADMIN — OMEPRAZOLE 20 MG: 20 CAPSULE, DELAYED RELEASE ORAL at 18:51

## 2023-02-24 RX ADMIN — HYDROCHLOROTHIAZIDE 25 MG: 25 TABLET ORAL at 05:26

## 2023-02-24 RX ADMIN — PROPOFOL 30 MG: 10 INJECTION, EMULSION INTRAVENOUS at 10:50

## 2023-02-24 RX ADMIN — GLYCOPYRROLATE 0.2 MG: 0.2 INJECTION INTRAMUSCULAR; INTRAVENOUS at 10:17

## 2023-02-24 RX ADMIN — SODIUM CHLORIDE, POTASSIUM CHLORIDE, SODIUM LACTATE AND CALCIUM CHLORIDE: 600; 310; 30; 20 INJECTION, SOLUTION INTRAVENOUS at 09:54

## 2023-02-24 RX ADMIN — HYDROMORPHONE HYDROCHLORIDE 1 MG: 2 INJECTION INTRAMUSCULAR; INTRAVENOUS; SUBCUTANEOUS at 10:22

## 2023-02-24 RX ADMIN — ATENOLOL 50 MG: 50 TABLET ORAL at 05:26

## 2023-02-24 RX ADMIN — FLUOXETINE 20 MG: 20 CAPSULE ORAL at 05:26

## 2023-02-24 ASSESSMENT — ENCOUNTER SYMPTOMS
CONSTIPATION: 0
SORE THROAT: 0
NAUSEA: 0
CHILLS: 0
VOMITING: 0
FLANK PAIN: 0
NERVOUS/ANXIOUS: 0
ABDOMINAL PAIN: 1
BLOOD IN STOOL: 0
MYALGIAS: 0
DIARRHEA: 0
WEAKNESS: 0
SHORTNESS OF BREATH: 0
COUGH: 0
FEVER: 0
HEADACHES: 0
FALLS: 0
ABDOMINAL PAIN: 0

## 2023-02-24 ASSESSMENT — COPD QUESTIONNAIRES
DURING THE PAST 4 WEEKS HOW MUCH DID YOU FEEL SHORT OF BREATH: NONE/LITTLE OF THE TIME
COPD SCREENING SCORE: 2
HAVE YOU SMOKED AT LEAST 100 CIGARETTES IN YOUR ENTIRE LIFE: NO/DON'T KNOW
DO YOU EVER COUGH UP ANY MUCUS OR PHLEGM?: NO/ONLY WITH OCCASIONAL COLDS OR INFECTIONS

## 2023-02-24 ASSESSMENT — PAIN DESCRIPTION - PAIN TYPE
TYPE: ACUTE PAIN
TYPE: SURGICAL PAIN
TYPE: SURGICAL PAIN
TYPE: ACUTE PAIN

## 2023-02-24 ASSESSMENT — PAIN SCALES - GENERAL
PAIN_LEVEL: 0
PAIN_LEVEL: 0

## 2023-02-24 ASSESSMENT — LIFESTYLE VARIABLES: SUBSTANCE_ABUSE: 0

## 2023-02-24 NOTE — OP REPORT
DATE OF OPERATION:   2/24/2023     PREOPERATIVE DIAGNOSIS: choledocholithiasis.Umbilical hernia.     POSTOPERATIVE DIAGNOSIS: acute cholecystitis and choledocholithiasis.Umbilical hernia.     PROCEDURE PERFORMED: Laparoscopic cholecystectomy and primary umbilical hernia repair    SURGEON:    Anamaria Hall M.D.    ASSISTANT:    THOM Urena.     ANESTHESIOLOGIST:  Imer Kellogg M.D.    ANESTHESIA:   General endotracheal anesthesia.    ASA CLASSIFICATION:  III.    INDICATIONS: The patient is a 72 year-old woman with clinical and radiographic findings of choledocholithiasis. She is taken to the operating room for planned laparoscopic cholecystectomy.     The nature of the surgical procedure warranted additional skilled operative assistance from an Advanced Registered Nurse Practitioner (ARNP). The assistant was present during the entire operation. The surgical assistant performed the following: provided assistance with optimal surgical exposure of the operative field, provided high complexity, subspecialty decision making input, and performed the skin closure and dressing application.     FINDINGS:  Mild intraabdominal adhesions right upper quadrant and right lobe of liver. Gallbladder wall thickening and acute inflammation. Pus in the gallbladder. Fatty liver.     WOUND CLASSIFICATION:  Class II, Clean Contaminated.    SPECIMEN:    Gallbladder.    ESTIMATED BLOOD LOSS:  25 mL.    PROCEDURE: Following informed consent consent, the patient was properly identified, taken to the operating room and placed in supine position where general endotracheal anesthesia was administered. Intravenous antibiotics were administered by the anesthesiologist in correct time interval. The patient voided prior to surgery. A urinary catheter was not placed. Sequential compression devices were employed. The abdomen was prepped and draped into a sterile field. A timeout was conducted with the full attention and  participation of all operating room personnel.    Marcaine 0.5% was used to infiltrate the port sites. A 5 mm infraumbilical midline incision was made and the subcutaneous tissues spread bluntly. The fascia was elevated with a hook and a Veress needle was atraumatically inserted. Carbon dioxide pneumoperitoneum was instilled. A 5 mm separator port was passed. A 5 mm 30 degree lens and camera was passed into the peritoneal cavity. An 11 mm port was placed in the epigastric midline under direct vision. Two 5 mm right subcostal ports were placed under direct vision.     The gallbladder was identified and elevated. Dissection was carried out to completely expose and delineate the hepatocystic triangle. The critical view was achieved definitively identifying the single cystic duct and single cystic artery entering the gallbladder. These structures were multiply clipped proximally, once distally and divided. The gallbladder was dissected free from the undersurface of the liver using electrocautery and placed within a laparoscopic specimen retrieval bag. The gallbladder was delivered intact from the abdominal cavity and submitted for pathology.  The gallbladder fossa was irrigated. All excess irrigant was evacuated from the abdominal cavity.  The gallbladder fossa was inspected. Hemostasis was satisfactory. The gallbladder fossa was inspected. Hemostasis was controlled with packing of the gallbladder fossa with SURGICEL® FIBRILLAR Absorbable Hemostat.    The epigastric port site fascia was approximated using a trocar site closure device with a 0 VICRYL® Plus Antibacterial suture.The umbilical port site was closed in a similar fashion, to include a pre-existing umbilical hernia.     The patient tolerated the procedure well, and there were no apparent complications. All sponge, needle, and instrument counts were correct on 2 separate occasions. The patient was awakened, extubated, and transferred to  to the post anesthesia  care unit (PACU) in satisfactory condition.       ____________________________________     Anamaria Hall M.D.    DD: 2/24/2023  10:49 AM

## 2023-02-24 NOTE — CARE PLAN
The patient is Stable - Low risk of patient condition declining or worsening    Shift Goals  Clinical Goals: NPO @ 0000, janell tiffanie  Patient Goals: Rest    Progress made toward(s) clinical / shift goals:  NPO @ 0000 - ed providedjanell scheduled for 2/24      Problem: Pain - Standard  Goal: Alleviation of pain or a reduction in pain to the patient’s comfort goal  2/23/2023 2329 by Kuldeep Mayo R.N.  Outcome: Progressing  2/23/2023 2329 by Kuldeep Mayo R.N.  Outcome: Progressing     Problem: Knowledge Deficit - Standard  Goal: Patient and family/care givers will demonstrate understanding of plan of care, disease process/condition, diagnostic tests and medications  2/23/2023 2329 by Kuldeep Mayo, R.N.  Outcome: Progressing  2/23/2023 2329 by Kuldeep Mayo R.N.  Outcome: Progressing

## 2023-02-24 NOTE — PROGRESS NOTES
"    DATE: 2/24/2023    Hospital Day 3  choledocholithiasis    INTERVAL EVENTS:  ERCP completed  LFT's continue downward trend  Bilirubin trend down    - Plan lap tiffanie today    REVIEW OF SYSTEMS:  Review of Systems   Constitutional:  Negative for chills and fever.   Respiratory:  Negative for shortness of breath.    Gastrointestinal:  Negative for abdominal pain, nausea and vomiting.   Genitourinary:         Voiding    Musculoskeletal:  Negative for myalgias.     PHYSICAL EXAMINATION:  Vital Signs: Blood Pressure (Abnormal) 184/81   Pulse (Abnormal) 57   Temperature 36.7 °C (98.1 °F) (Temporal)   Respiration 17   Height 1.511 m (4' 11.49\")   Weight 79.4 kg (175 lb)   Oxygen Saturation 97%   Physical Exam  Vitals and nursing note reviewed.   Constitutional:       Appearance: She is not ill-appearing.   Eyes:      Conjunctiva/sclera: Conjunctivae normal.   Cardiovascular:      Rate and Rhythm: Normal rate.   Pulmonary:      Effort: Pulmonary effort is normal.   Abdominal:      General: There is no distension.      Palpations: Abdomen is soft.      Tenderness: There is no abdominal tenderness. There is no guarding.   Skin:     General: Skin is warm and dry.   Neurological:      Mental Status: She is alert and oriented to person, place, and time.       LABORATORY VALUES:   Recent Labs     02/22/23  0620 02/23/23  0302 02/24/23  0331   WBC 11.0* 7.5 7.2   RBC 4.42 3.66* 3.64*   HEMOGLOBIN 13.5 11.3* 11.2*   HEMATOCRIT 42.1 34.8* 34.0*   MCV 95.2 95.1 93.4   MCH 30.5 30.9 30.8   MCHC 32.1* 32.5* 32.9*   RDW 45.6 47.5 46.0   PLATELETCT 239 181 183   MPV 10.7 10.7 10.9     Recent Labs     02/22/23  0620 02/23/23  0302 02/24/23  0331   SODIUM 138 138 140   POTASSIUM 3.7 3.1* 3.2*   CHLORIDE 102 102 105   CO2 23 24 25   GLUCOSE 167* 116* 153*   BUN 16 20 16   CREATININE 0.71 1.05 0.82   CALCIUM 9.1 8.3* 8.7     Recent Labs     02/22/23  0620 02/23/23  0302 02/23/23  1334 02/24/23  0331   ASTSGOT 752* 365*  --  232* "   ALTSGPT 560* 457*  --  378*   TBILIRUBIN 3.3* 6.4*  --  2.4*   ALKPHOSPHAT 195* 155*  --  158*   GLOBULIN 3.2 2.8  --  3.0   INR  --   --  1.10  --      Recent Labs     02/23/23  1334   INR 1.10        IMAGING:   [id number] STONE REMOVAL   Final Result      Portable intraoperative imaging with findings as described above.      [id number]/[id number]   Final Result      1.  There is dependent gallbladder sludge with minimal gallbladder wall thickening and pericholecystic fluid.   2.  Mildly dilated common bile duct with no intrahepatic biliary dilatation.   3.  In the appropriate clinical setting this could represent acute cholecystitis.   4.  There is a potential 1 mm nonocclusive round filling defect in the distal common duct seen on only one sequence and not confirmed on other imaging sequences.   5.  There is a trace of nonspecific peripancreatic fluid.      US-RUQ   Final Result      1.  Gallbladder sludge, gallbladder wall thickening and dilation of the common bile duct up to 1 cm. Findings could be seen in the setting of acute cholecystitis. A HIDA scan can be obtained for further evaluation if clinically indicated.   2.  Hepatic steatosis and/or diffuse hepatocellular disease.          Core Measures & Quality Metrics    ASSESSMENT AND PLAN:   * Gallstone pancreatitis- (present on admission)  Assessment & Plan  Presented w RUQ pain  Elevated LFTs, bilirubin and lipase  US shows sludge with thickened GB wall  MRCP with dependent gallbladder sludge with minimal gallbladder wall thickening and pericholecystic fluid, mildly dilated common bile duct with no intrahepatic biliary dilatation and a  potential 1 mm nonocclusive round filling defect in the distal common duct seen on only one sequence and not confirmed on other imaging sequences.  2/23 ERCP completed with stone extraction  2/24 Modesta moreno        Discussed patient condition with RN, Patient, and general surgery, Dr. Hall.

## 2023-02-24 NOTE — ANESTHESIA PREPROCEDURE EVALUATION
Case: [id number] Date/Time: 02/24/23 1000    Procedure: CHOLECYSTECTOMY, LAPAROSCOPIC    Location: [facility] OR 09 / SURGERY [facility]    Surgeons: Anamaria Hall M.D.          Relevant Problems   CARDIAC   (positive) Essential hypertension       Physical Exam    Airway   Mallampati: II  TM distance: >3 FB  Neck ROM: full       Cardiovascular - normal exam  Rhythm: regular  Rate: normal  (-) murmur     Dental - normal exam             Pulmonary - normal exam  Breath sounds clear to auscultation     Abdominal    Neurological - normal exam               Anesthesia Plan    ASA 3   ASA physical status 3 criteria: hypertension - poorly controlled    Plan - general       Airway plan will be ETT          Induction: intravenous    Postoperative Plan: Postoperative administration of opioids is intended.    Pertinent diagnostic labs and testing reviewed    Informed Consent:    Anesthetic plan and risks discussed with patient.    Use of blood products discussed with: patient whom consented to blood products.

## 2023-02-24 NOTE — PROGRESS NOTES
Patient back from PACU.  Declines pain, N/V at this time.     x4 lap stabs, well approximated with dermabond and FERNANDO.

## 2023-02-24 NOTE — PROGRESS NOTES
Gastroenterology Progress Note               Author:  SOLANGE Moreau Date & Time Created: 2/24/2023 3:59 PM       Patient ID:  Name:             Sherry Louis  YOB: 1950  Age:                 72 y.o.  female  MRN:               [id number]        Medical Decision Making, by Problem:  Active Hospital Problems    Diagnosis     Gallstone pancreatitis [K85.10]            Presenting Chief Complaint:  gallbladder pancreatitis     History of Present Illness:   This is a 72-year-old female with a past medical history of hypertension, previous tobacco use who presented to [facility] on 2/22/2023 with bilateral upper quadrant abdominal pain.  She reports pain was acute onset early in the day on 2/22/2023 described as twisting and was associated with nausea, vomiting.  Patient reports pain was ongoing/progressive over the next 5 hours which prompted her to seek care in the emergency department.  In the emergency department, leukocytosis 11, transaminitis noted with , , alk phos 195, total bilirubin 3.3.  Total bilirubin subsequently increased overnight to 6.4.  Initial lipase 537.  Right upper quadrant ultrasound was obtained showing gallbladder sludge, gallbladder wall thickening and dilation of the common bile duct up to 1 cm concerning for acute cholecystitis.  Subsequent MRCP showed dependent gallbladder sludge with minimal gallbladder wall thickening and.  Cholecystic fluid.  Additionally, there was mild dilation of common bile duct without intrahepatic biliary dilatation.  General surgery was consulted with plan for cholecystectomy after ERCP.     Otherwise the patient is doing fine without complaints of fever/ chills/ weight loss/ appetite change/ dysphagia/ odynophagia/ heartburn/ bloating/ constipation/ melena/ hematochezia.      Interval History:  2/24/2023: Patient postprocedure day 1 s/p ERCP.  Patient had small stone and sludge removed from ducts.  LFTs  and bilirubin improving.  Upon assessment, patient seen at bedside with family, AAOx4.  S/p cholecystectomy later today.  She is tolerating p.o.'s and reports feeling well without nausea, vomiting, postoperative pain tolerable, no other complaints.        Hospital Medications:  Current Facility-Administered Medications   Medication Dose Frequency Provider Last Rate Last Admin    Respiratory Therapy Consult   Continuous RT Jayleen Laura M.D.        ondansetron (ZOFRAN) syringe/vial injection 4 mg  4 mg Q4HRS PRN Jayleen Laura M.D.        ondansetron (ZOFRAN ODT) dispertab 4 mg  4 mg Q4HRS PRN Jayleen Laura M.D.        docusate sodium (COLACE) capsule 100 mg  100 mg BID Jayleen Laura M.D.        senna-docusate (PERICOLACE or SENOKOT S) 8.6-50 MG per tablet 1 Tablet  1 Tablet Nightly Jayleen Laura M.D.        senna-docusate (PERICOLACE or SENOKOT S) 8.6-50 MG per tablet 1 Tablet  1 Tablet Q24HRS PRN Jayleen Laura M.D.        polyethylene glycol/lytes (MIRALAX) PACKET 1 Packet  1 Packet BID Jayleen Laura M.D.        magnesium hydroxide (MILK OF MAGNESIA) suspension 30 mL  30 mL DAILY Jayleen Laura M.D.        bisacodyl (DULCOLAX) suppository 10 mg  10 mg Q24HRS PRN Jayleen Laura M.D.        sodium phosphate (Fleet) enema 133 mL  1 Each Once PRN Jayleen Laura M.D.        LR infusion   Continuous Jayleen Laura M.D.   Stopped at 02/23/23 2000    ketorolac (TORADOL) injection 15 mg  15 mg Q6HRS Jayleen Laura M.D.   15 mg at 02/23/23 0525    Followed by    [START ON 2/25/2023] ibuprofen (MOTRIN) tablet 800 mg  800 mg TID PRN Jayleen Laura M.D.        morphine 4 MG/ML injection 2 mg  2 mg Q HOUR PRN Jayleen Laura M.D.        Or    morphine 4 MG/ML injection 4 mg  4 mg Q HOUR ALVAREZN Jayleen Laura M.D.        omeprazole (PRILOSEC) capsule 20 mg  20 mg Q EVENING Salud Fox, A.P.N.   20 mg at 02/23/23 1755    hydroCHLOROthiazide (HYDRODIURIL) tablet 25 mg  25 mg DAILY Salud Fox, A.P.N.   25  "mg at 02/24/23 0526    FLUoxetine (PROZAC) capsule 20 mg  20 mg DAILY Salud Fox, A.P.N.   20 mg at 02/24/23 0526    atenolol (TENORMIN) tablet 50 mg  50 mg DAILY Salud Fox, A.P.N.   50 mg at 02/24/23 0526   Last reviewed on 2/24/2023  9:44 AM by Verna Kelly R.N.       Review of Systems:  Review of Systems   Constitutional:  Negative for chills, fever and malaise/fatigue.   HENT:  Negative for congestion and sore throat.    Respiratory:  Negative for cough and shortness of breath.    Cardiovascular:  Negative for chest pain and leg swelling.   Gastrointestinal:  Positive for abdominal pain (Postoperative RUQ pain s/p cholecystectomy, relieved with ice pack). Negative for blood in stool, constipation, diarrhea, melena, nausea and vomiting.        + Appetite    Genitourinary:  Negative for dysuria and flank pain.   Musculoskeletal:  Negative for falls and myalgias.   Neurological:  Negative for weakness and headaches.   Psychiatric/Behavioral:  Negative for substance abuse. The patient is not nervous/anxious.    All other systems reviewed and are negative.      Vital signs:  Weight/BMI: Body mass index is 34.77 kg/m².  /60   Pulse (!) 57   Temp 37.1 °C (98.8 °F) (Temporal)   Resp 16   Ht 1.511 m (4' 11.49\")   Wt 79.4 kg (175 lb)   SpO2 95%   Vitals:    02/24/23 1343 02/24/23 1413 02/24/23 1443 02/24/23 1508   BP: 115/55 136/64 122/60    Pulse: (!) 59 60 68 (!) 57   Resp:   16 16   Temp:   37.1 °C (98.8 °F)    TempSrc:   Temporal    SpO2: 94% 93% 92% 95%   Weight:       Height:         Oxygen Therapy:  Pulse Oximetry: 95 %, O2 (LPM): 1, O2 Delivery Device: Silicone Nasal Cannula    Intake/Output Summary (Last 24 hours) at 2/24/2023 1559  Last data filed at 2/24/2023 1102  Gross per 24 hour   Intake 2922.81 ml   Output 50 ml   Net 2872.81 ml         Physical Exam:  Physical Exam  Vitals and nursing note reviewed.   Constitutional:       General: She is not in acute distress.     Appearance: She is " obese. She is not ill-appearing or toxic-appearing.   HENT:      Head: Normocephalic.      Nose: Nose normal. No congestion.      Mouth/Throat:      Mouth: Mucous membranes are moist.      Pharynx: Oropharynx is clear. No oropharyngeal exudate.   Eyes:      General: No scleral icterus.     Extraocular Movements: Extraocular movements intact.      Conjunctiva/sclera: Conjunctivae normal.   Cardiovascular:      Rate and Rhythm: Normal rate and regular rhythm.      Pulses: Normal pulses.      Heart sounds: Normal heart sounds. No murmur heard.  Pulmonary:      Effort: Pulmonary effort is normal. No respiratory distress.      Breath sounds: Normal breath sounds. No wheezing.   Chest:      Chest wall: No tenderness.   Abdominal:      General: Abdomen is flat. Bowel sounds are normal. There is no distension.      Palpations: Abdomen is soft.      Tenderness: There is no guarding.      Comments:  Mild tenderness with palpation to the right upper quadrant s/p cholecystectomy   Musculoskeletal:      Right lower leg: No edema.      Left lower leg: No edema.   Skin:     General: Skin is warm and dry.      Capillary Refill: Capillary refill takes less than 2 seconds.      Coloration: Skin is not jaundiced or pale.   Neurological:      General: No focal deficit present.      Mental Status: She is alert and oriented to person, place, and time. Mental status is at baseline.      Motor: No weakness.   Psychiatric:         Mood and Affect: Mood normal.         Behavior: Behavior normal.         Thought Content: Thought content normal.         Judgment: Judgment normal.           Labs:  Recent Labs     02/22/23  0620 02/23/23  0302 02/24/23  0331   SODIUM 138 138 140   POTASSIUM 3.7 3.1* 3.2*   CHLORIDE 102 102 105   CO2 23 24 25   BUN 16 20 16   CREATININE 0.71 1.05 0.82   CALCIUM 9.1 8.3* 8.7     Recent Labs     02/22/23  0620 02/23/23  0302 02/24/23  0331   ALTSGPT 560* 457* 378*   ASTSGOT 752* 365* 232*   ALKPHOSPHAT 195* 155*  158*   TBILIRUBIN 3.3* 6.4* 2.4*   LIPASE 537* 19  --    GLUCOSE 167* 116* 153*     Recent Labs     02/22/23  0620 02/23/23  0302 02/24/23  0331   WBC 11.0* 7.5 7.2   NEUTSPOLYS 91.10* 81.80* 87.90*   LYMPHOCYTES 3.60* 9.30* 6.80*   MONOCYTES 4.60 7.20 4.60   EOSINOPHILS 0.10 1.20 0.00   BASOPHILS 0.10 0.10 0.10   ASTSGOT 752* 365* 232*   ALTSGPT 560* 457* 378*   ALKPHOSPHAT 195* 155* 158*   TBILIRUBIN 3.3* 6.4* 2.4*     Recent Labs     02/22/23 0620 02/23/23 0302 02/23/23  1334 02/24/23  0331   RBC 4.42 3.66*  --  3.64*   HEMOGLOBIN 13.5 11.3*  --  11.2*   HEMATOCRIT 42.1 34.8*  --  34.0*   PLATELETCT 239 181  --  183   PROTHROMBTM  --   --  14.1  --    INR  --   --  1.10  --      Recent Results (from the past 24 hour(s))   CBC with Differential: Tomorrow AM    Collection Time: 02/24/23  3:31 AM   Result Value Ref Range    WBC 7.2 4.8 - 10.8 K/uL    RBC 3.64 (L) 4.20 - 5.40 M/uL    Hemoglobin 11.2 (L) 12.0 - 16.0 g/dL    Hematocrit 34.0 (L) 37.0 - 47.0 %    MCV 93.4 81.4 - 97.8 fL    MCH 30.8 27.0 - 33.0 pg    MCHC 32.9 (L) 33.6 - 35.0 g/dL    RDW 46.0 35.9 - 50.0 fL    Platelet Count 183 164 - 446 K/uL    MPV 10.9 9.0 - 12.9 fL    Neutrophils-Polys 87.90 (H) 44.00 - 72.00 %    Lymphocytes 6.80 (L) 22.00 - 41.00 %    Monocytes 4.60 0.00 - 13.40 %    Eosinophils 0.00 0.00 - 6.90 %    Basophils 0.10 0.00 - 1.80 %    Immature Granulocytes 0.60 0.00 - 0.90 %    Nucleated RBC 0.00 /100 WBC    Neutrophils (Absolute) 6.30 2.00 - 7.15 K/uL    Lymphs (Absolute) 0.49 (L) 1.00 - 4.80 K/uL    Monos (Absolute) 0.33 0.00 - 0.85 K/uL    Eos (Absolute) 0.00 0.00 - 0.51 K/uL    Baso (Absolute) 0.01 0.00 - 0.12 K/uL    Immature Granulocytes (abs) 0.04 0.00 - 0.11 K/uL    NRBC (Absolute) 0.00 K/uL   Comp Metabolic Panel (CMP): Tomorrow AM    Collection Time: 02/24/23  3:31 AM   Result Value Ref Range    Sodium 140 135 - 145 mmol/L    Potassium 3.2 (L) 3.6 - 5.5 mmol/L    Chloride 105 96 - 112 mmol/L    Co2 25 20 - 33 mmol/L     Anion Gap 10.0 7.0 - 16.0    Glucose 153 (H) 65 - 99 mg/dL    Bun 16 8 - 22 mg/dL    Creatinine 0.82 0.50 - 1.40 mg/dL    Calcium 8.7 8.5 - 10.5 mg/dL    AST(SGOT) 232 (H) 12 - 45 U/L    ALT(SGPT) 378 (H) 2 - 50 U/L    Alkaline Phosphatase 158 (H) 30 - 99 U/L    Total Bilirubin 2.4 (H) 0.1 - 1.5 mg/dL    Albumin 3.2 3.2 - 4.9 g/dL    Total Protein 6.2 6.0 - 8.2 g/dL    Globulin 3.0 1.9 - 3.5 g/dL    A-G Ratio 1.1 g/dL   CORRECTED CALCIUM    Collection Time: 02/24/23  3:31 AM   Result Value Ref Range    Correct Calcium 9.3 8.5 - 10.5 mg/dL   ESTIMATED GFR    Collection Time: 02/24/23  3:31 AM   Result Value Ref Range    GFR (CKD-EPI) 75 >60 mL/min/1.73 m 2   Histology Request    Collection Time: 02/24/23 10:37 AM   Result Value Ref Range    Pathology Request Sent to Histo        Radiology Review:  [id number] STONE REMOVAL   Final Result      Portable intraoperative imaging with findings as described above.      [id number]/O   Final Result      1.  There is dependent gallbladder sludge with minimal gallbladder wall thickening and pericholecystic fluid.   2.  Mildly dilated common bile duct with no intrahepatic biliary dilatation.   3.  In the appropriate clinical setting this could represent acute cholecystitis.   4.  There is a potential 1 mm nonocclusive round filling defect in the distal common duct seen on only one sequence and not confirmed on other imaging sequences.   5.  There is a trace of nonspecific peripancreatic fluid.      US-RUQ   Final Result      1.  Gallbladder sludge, gallbladder wall thickening and dilation of the common bile duct up to 1 cm. Findings could be seen in the setting of acute cholecystitis. A HIDA scan can be obtained for further evaluation if clinically indicated.   2.  Hepatic steatosis and/or diffuse hepatocellular disease.            MDM (Data Review):   -Records reviewed and summarized in current documentation  -I personally reviewed and interpreted the laboratory  results  -I personally reviewed the radiology images    Assessment/Recommendations:  Impressions:  Gallstone pancreatitis s/p ERCP 2/23/2023 and s/p laparoscopic cholecystectomy 2/24/2023  Transaminitis-improving  Hyperbilirubinemia-having  Hypertension     MDM:  This is a pleasant 72-year-old female presenting with gallstone pancreatitis.  She is s/p ERCP 2/23/2023 and s/p laparoscopic cholecystectomy 2/24/2023.  LFTs and bilirubin improving, no leukocytosis, and hemoglobin stable.  Patient also reports feeling well without nausea, vomiting, and is tolerating oral intake without difficulty.  Low suspicion for pancreatitis, bleeding, infection s/p ERCP.  Patient did NOT have stent placed during ERCP and therefore will not require 3-month follow-up appointment for removal.     Recommendations:  Laparoscopic cholecystectomy postoperative care per surgical team  Monitor LFTs    GI to sign off.  Please reconsult for any further questions or concerns      Core Quality Measures   Reviewed items::  Labs, Medications and Radiology reports reviewed

## 2023-02-24 NOTE — ANESTHESIA PROCEDURE NOTES
Airway    Date/Time: 2/24/2023 10:06 AM  Performed by: Imer Kellogg M.D.  Authorized by: Imer Kellogg M.D.     Location:  OR  Urgency:  Elective  Indications for Airway Management:  Anesthesia      Spontaneous Ventilation: absent    Sedation Level:  Deep  Preoxygenated: Yes    Patient Position:  Sniffing  Mask Difficulty Assessment:  1 - vent by mask  Final Airway Type:  Endotracheal airway  Final Endotracheal Airway:  ETT  Cuffed: Yes    Technique Used for Successful ETT Placement:  Direct laryngoscopy    Insertion Site:  Oral  Blade Type:  Zora  Laryngoscope Blade/Videolaryngoscope Blade Size:  3  ETT Size (mm):  7.0  Measured from:  Teeth  ETT to Teeth (cm):  21  Placement Verified by: auscultation and capnometry    Cormack-Lehane Classification:  Grade IIa - partial view of glottis  Number of Attempts at Approach:  1

## 2023-02-24 NOTE — CARE PLAN
The patient is Stable - Low risk of patient condition declining or worsening    Shift Goals  Clinical Goals: ERCP today, pain management, Hydration  Patient Goals: Rest, POC    Progress made toward(s) clinical / shift goals:  ERCP complete, Medicated per MARTamiee scheduled from Tomorrow 2/24/23

## 2023-02-24 NOTE — ANESTHESIA POSTPROCEDURE EVALUATION
Patient: Sherry Louis    Procedure Summary     Date: 02/24/23 Room / Location: [facility] 08 / SURGERY [facility]    Anesthesia Start: 0954 Anesthesia Stop: 1102    Procedure: CHOLECYSTECTOMY, LAPAROSCOPIC (Abdomen) Diagnosis: (choledocholithiasis)    Surgeons: Anamaria Hall M.D. Responsible Provider: Imer Kellogg M.D.    Anesthesia Type: general ASA Status: 3          Final Anesthesia Type: general  Last vitals  BP   Blood Pressure : 129/60    Temp   36.5 °C (97.7 °F)    Pulse   76   Resp   16    SpO2   91 %      Anesthesia Post Evaluation    Patient location during evaluation: PACU  Patient participation: complete - patient participated  Level of consciousness: awake and alert  Pain score: 0    Airway patency: patent  Anesthetic complications: no  Cardiovascular status: hemodynamically stable  Respiratory status: acceptable  Hydration status: euvolemic    PONV: none          No notable events documented.     Nurse Pain Score: 0 (NPRS)

## 2023-02-24 NOTE — OR NURSING
Patient recovered well post op. A&Ox4. VSS, room air. Surgical sites CDI. Surgical pain managed. Patient able to drink fluids without Nausea and vomiting. PT belongings in her room. Spouse updated and discussed plan of care. Report called to Jasmyn COMER.

## 2023-02-24 NOTE — ANESTHESIA POSTPROCEDURE EVALUATION
Patient: Sherry Louis    Procedure Summary     Date: 02/23/23 Room / Location: [facility] ROOM 26 / SURGERY SAME DAY [facility]    Anesthesia Start: 1432 Anesthesia Stop: 1501    Procedures:       ERCP (ENDOSCOPIC RETROGRADE CHOLANGIOPANCREATOGRAPHY)  (Esophagus)      SPHINCTEROTOMY (Esophagus)      ERCP, WITH CALCULUS REMOVAL FROM BILE OR PANCREATIC DUCT (Esophagus) Diagnosis: (choledocholithiasis)    Surgeons: Landon Andrade M.D. Responsible Provider: Cathleen Quesada M.D.    Anesthesia Type: general ASA Status: 2          Final Anesthesia Type: general  Last vitals  BP   Blood Pressure : (!) 167/78    Temp   36.9 °C (98.4 °F)    Pulse   61   Resp   18    SpO2   94 %      Anesthesia Post Evaluation    Patient location during evaluation: PACU  Patient participation: complete - patient participated  Level of consciousness: awake and alert  Pain score: 0    Airway patency: patent  Anesthetic complications: no  Cardiovascular status: hemodynamically stable  Respiratory status: acceptable  Hydration status: euvolemic    PONV: none          No notable events documented.     Nurse Pain Score: 0 (NPRS)

## 2023-02-24 NOTE — PROGRESS NOTES
Bedside report received.  Assessment complete.  A&O x 4. Patient calls appropriately.  Patient ambulates without assist.   Patient has 0/10 pain.   Denies N&V. Patient currently NPO awaiting procedure.  + void  Patient denies SOB.  Patient pleasant with staff and sitting up in bed.  Review plan with of care with patient. Call light and personal belongings within reach. Hourly rounding in place. All needs met at this time.

## 2023-02-24 NOTE — ANESTHESIA TIME REPORT
Anesthesia Start and Stop Event Times     Date Time Event    2/24/2023 0932 Ready for Procedure     0954 Anesthesia Start     1102 Anesthesia Stop        Responsible Staff  02/24/23    Name Role Begin End    Imer Kellogg M.D. Anesth 0954 1102        Overtime Reason:  no overtime (within assigned shift)    Comments:

## 2023-02-25 VITALS
DIASTOLIC BLOOD PRESSURE: 74 MMHG | HEIGHT: 59 IN | OXYGEN SATURATION: 93 % | SYSTOLIC BLOOD PRESSURE: 162 MMHG | RESPIRATION RATE: 14 BRPM | BODY MASS INDEX: 35.28 KG/M2 | TEMPERATURE: 99 F | WEIGHT: 175 LBS | HEART RATE: 61 BPM

## 2023-02-25 LAB
ALBUMIN SERPL BCP-MCNC: 3.2 G/DL (ref 3.2–4.9)
ALBUMIN/GLOB SERPL: 1.1 G/DL
ALP SERPL-CCNC: 131 U/L (ref 30–99)
ALT SERPL-CCNC: 241 U/L (ref 2–50)
ANION GAP SERPL CALC-SCNC: 11 MMOL/L (ref 7–16)
AST SERPL-CCNC: 126 U/L (ref 12–45)
BASOPHILS # BLD AUTO: 0.2 % (ref 0–1.8)
BASOPHILS # BLD: 0.02 K/UL (ref 0–0.12)
BILIRUB SERPL-MCNC: 1.5 MG/DL (ref 0.1–1.5)
BUN SERPL-MCNC: 28 MG/DL (ref 8–22)
CALCIUM ALBUM COR SERPL-MCNC: 9.1 MG/DL (ref 8.5–10.5)
CALCIUM SERPL-MCNC: 8.5 MG/DL (ref 8.5–10.5)
CHLORIDE SERPL-SCNC: 98 MMOL/L (ref 96–112)
CO2 SERPL-SCNC: 24 MMOL/L (ref 20–33)
CREAT SERPL-MCNC: 1.08 MG/DL (ref 0.5–1.4)
EOSINOPHIL # BLD AUTO: 0.09 K/UL (ref 0–0.51)
EOSINOPHIL NFR BLD: 0.7 % (ref 0–6.9)
ERYTHROCYTE [DISTWIDTH] IN BLOOD BY AUTOMATED COUNT: 46.9 FL (ref 35.9–50)
GFR SERPLBLD CREATININE-BSD FMLA CKD-EPI: 54 ML/MIN/1.73 M 2
GLOBULIN SER CALC-MCNC: 3 G/DL (ref 1.9–3.5)
GLUCOSE SERPL-MCNC: 107 MG/DL (ref 65–99)
HCT VFR BLD AUTO: 31.9 % (ref 37–47)
HGB BLD-MCNC: 10.6 G/DL (ref 12–16)
IMM GRANULOCYTES # BLD AUTO: 0.07 K/UL (ref 0–0.11)
IMM GRANULOCYTES NFR BLD AUTO: 0.6 % (ref 0–0.9)
LYMPHOCYTES # BLD AUTO: 1.12 K/UL (ref 1–4.8)
LYMPHOCYTES NFR BLD: 9.2 % (ref 22–41)
MCH RBC QN AUTO: 31 PG (ref 27–33)
MCHC RBC AUTO-ENTMCNC: 33.2 G/DL (ref 33.6–35)
MCV RBC AUTO: 93.3 FL (ref 81.4–97.8)
MONOCYTES # BLD AUTO: 0.66 K/UL (ref 0–0.85)
MONOCYTES NFR BLD AUTO: 5.4 % (ref 0–13.4)
NEUTROPHILS # BLD AUTO: 10.22 K/UL (ref 2–7.15)
NEUTROPHILS NFR BLD: 83.9 % (ref 44–72)
NRBC # BLD AUTO: 0 K/UL
NRBC BLD-RTO: 0 /100 WBC
PLATELET # BLD AUTO: 224 K/UL (ref 164–446)
PMV BLD AUTO: 10.8 FL (ref 9–12.9)
POTASSIUM SERPL-SCNC: 3.3 MMOL/L (ref 3.6–5.5)
PROT SERPL-MCNC: 6.2 G/DL (ref 6–8.2)
RBC # BLD AUTO: 3.42 M/UL (ref 4.2–5.4)
SODIUM SERPL-SCNC: 133 MMOL/L (ref 135–145)
WBC # BLD AUTO: 12.2 K/UL (ref 4.8–10.8)

## 2023-02-25 PROCEDURE — 700111 HCHG RX REV CODE 636 W/ 250 OVERRIDE (IP): Performed by: SURGERY

## 2023-02-25 PROCEDURE — 85025 COMPLETE CBC W/AUTO DIFF WBC: CPT

## 2023-02-25 PROCEDURE — 700102 HCHG RX REV CODE 250 W/ 637 OVERRIDE(OP): Performed by: NURSE PRACTITIONER

## 2023-02-25 PROCEDURE — 99024 POSTOP FOLLOW-UP VISIT: CPT | Performed by: NURSE PRACTITIONER

## 2023-02-25 PROCEDURE — 80053 COMPREHEN METABOLIC PANEL: CPT

## 2023-02-25 PROCEDURE — 99024 POSTOP FOLLOW-UP VISIT: CPT | Performed by: SURGERY

## 2023-02-25 PROCEDURE — A9270 NON-COVERED ITEM OR SERVICE: HCPCS | Performed by: NURSE PRACTITIONER

## 2023-02-25 RX ORDER — IBUPROFEN 800 MG/1
800 TABLET ORAL 3 TIMES DAILY PRN
Qty: 30 TABLET | COMMUNITY
Start: 2023-02-25

## 2023-02-25 RX ADMIN — FLUOXETINE 20 MG: 20 CAPSULE ORAL at 06:40

## 2023-02-25 RX ADMIN — KETOROLAC TROMETHAMINE 15 MG: 30 INJECTION, SOLUTION INTRAMUSCULAR; INTRAVENOUS at 00:04

## 2023-02-25 RX ADMIN — HYDROCHLOROTHIAZIDE 25 MG: 25 TABLET ORAL at 06:00

## 2023-02-25 ASSESSMENT — PAIN DESCRIPTION - PAIN TYPE: TYPE: SURGICAL PAIN

## 2023-02-25 NOTE — CARE PLAN
The patient is Stable - Low risk of patient condition declining or worsening    Shift Goals  Clinical Goals: Procedure today, pain management  Patient Goals: Rest    Progress made toward(s) clinical / shift goals:  Lap Torrie today, Medicated per MAR

## 2023-02-25 NOTE — CARE PLAN
Problem: Pain - Standard  Goal: Alleviation of pain or a reduction in pain to the patient’s comfort goal  Outcome: Progressing     Problem: Knowledge Deficit - Standard  Goal: Patient and family/care givers will demonstrate understanding of plan of care, disease process/condition, diagnostic tests and medications  Outcome: Progressing     The patient is Stable - Low risk of patient condition declining or worsening    Shift Goals  Clinical Goals: Monitor Labs  Patient Goals: discharge    Progress made toward(s) clinical / shift goals:  Minimal c/o pain. Likely d/c in a.m.    Patient is not progressing towards the following goals:

## 2023-02-25 NOTE — DISCHARGE SUMMARY
DISCHARGE  SUMMARY    DATE OF ADMISSION: 2/22/2023    DATE OF DISCHARGE: 2/25/2023    DISCHARGE DIAGNOSIS:  Choledocholithiasis    CONSULTATIONS:  Landon Andrade MD.  Gastroenterology    PROCEDURES:  Procedure completed by Dr. Andrade on 2/23/2023: ERCP, sphincterotomy, calculus removal from bile duct  Procedure completed by Dr. Hall on 2/24/2023: Laparoscopic cholecystectomy     BRIEF HPI and HOSPITAL COURSE:  The patient presented to the Emergency Department complaining of severe right upper quadrant abdominal pain, imagining and laboratory studies were consistent with choledocholithiasis.  GI was consulted and she underwent an ERCP with stone removal.  Subsequently she was taken to the operating room for a laparoscopic cholecystectomy.  On day of discharge her LFT's are improved, her pain is well controlled, she is tolerating a regular diet and is mobilizing well.     DISPOSITION:   Discharged home on 2/25/2023. The patient was counseled and questions were answered. Specifically, signs and symptoms of infection, respiratory decompensation and persistent or worsening pain were discussed and the patient agrees to seek medical attention if any of these develop.    DISCHARGE MEDICATIONS:  The patients controlled substance history was reviewed and a controlled substance use informed consent (if applicable) was provided by [facility] and the patient has been prescribed.     Medication List        Start taking these medications        Instructions   ibuprofen 800 MG Tabs  Commonly known as: MOTRIN   Take 1 Tablet by mouth 3 times a day as needed for Moderate Pain.  Dose: 800 mg            Continue taking these medications        Instructions   acetaminophen 500 MG Tabs  Commonly known as: TYLENOL   Take 500-1,000 mg by mouth every 6 hours as needed for Moderate Pain.  Dose: 500-1,000 mg     atenolol 50 MG Tabs  Commonly known as: TENORMIN   Take 1 Tablet by mouth every day.  Dose: 50  mg     FLUoxetine 20 MG Caps  Commonly known as: PROZAC   Take 1 Capsule by mouth every day.  Dose: 20 mg     hydroCHLOROthiazide 25 MG Tabs  Commonly known as: HYDRODIURIL   Take 1 Tablet by mouth every day.  Dose: 25 mg     omeprazole 20 MG delayed-release capsule  Commonly known as: PRILOSEC   Take 20 mg by mouth every evening.  Dose: 20 mg     VITAMIN B1 PO   Take 1 Tablet by mouth every evening.  Dose: 1 Tablet            You will be given a prescription for pain medication at discharge. Please take these as directed. It is important to remember not to take medications on an empty stomach as this may cause nausea.  You may also take over the counter acetaminophen and/or NSAIDS (ibuprofen, Aleve, Advil, Motrin) per the package instructions.  You may also use ice to the wound to decrease pain and swelling. You may alternate 20 minutes on and 20 minutes off with the ice for the first 24-48 hours. Make sure you place a washcloth or towel between the ice pack and your skin.  Please note that narcotic pain medication cannot be refilled unless you are seen by a doctor. Make sure you call the office if you are running low on medication or if the dose you have been prescribed is not working well for you.    ACTIVITY:  After discharge from the hospital, you may resume full routine activities; however, there should be no heavy lifting (greater than 20 pounds or a bag of groceries) and no strenuous activities for at least 2 weeks. The duration may be longer, depending on your surgical procedure. Routine activities of daily living are acceptable. Activity level should be addressed at your post-op follow up appointment. You may drive whenever you are off pain medications and are able to perform the activities needed to drive, i.e., turning, bending, twisting, etc.    WOUND CARE:  You may shower, but do not submerge in a bath for at least two weeks. If you have wound dressings, they may come off after 48 hours. If you have  skin glue to the wound, this will fall off on its own, do not pick at it. If you have steri strips to the wound, these will fall off on their own, do not pick at them, may trim the edges if needed.    DIET:  Upon discharge from the hospital, you may resume your normal preoperative diet, unless specifically directed otherwise. Depending on how you are feeling and whether you have nausea or not, you may wish to stay with a bland diet for the first few days. However, you can advance this as quickly as you feel ready.      FOLLOW UP:  [facility]  [address]  [facility] [address]  [phone]    Schedule an appointment as soon as possible for a visit in 2 week(s)  ACS follow up clinic    Call the office if you have: (1) Fevers to more than 101F, (2) Unusual chest or leg pain, (3) Drainage or fluid from incision that may be foul smelling, increased tenderness or soreness at the wound or the wound edges are no longer together, redness or swelling at the incision site. Do not hesitate to call with any other questions.    TIME SPENT ON DISCHARGE: 32 minutes      ____________________________________________  ETRRENCE Kendrick.    DD: 2/24/2023 5:50 PM

## 2023-02-25 NOTE — DISCHARGE PLANNING
[facility] chart review completed. Current discharge considerations are home with outpatient f/u. Noted high 6 clicks as above and pt also reports she has been ambulatory/functionally close to baseline and reports no functional concerns with dc to home once medically cleared. She reports no concerns with transportation for dc home or follow ups.      Per chart review, three procedures completed today: 1)ERCP with calculus removal from bile or pancreatic duct; 2)Sphincterotomy; 3)Cholecystectomy, laparoscopic and primary umbilical hernia repair.    TCN will continue to monitor for possible dc planning needs based on outcomes of GI/interventions and pt in agreement.     Completed:  Choice obtained: none needed; see above  [facility] referral not sent; pt declines, low LACE and pt prefers outpatient if needed

## 2023-02-25 NOTE — PROGRESS NOTES
1115 received discharge orders.  Ordered discharge lounge.  Patient taken by CNA with belongings in wheelchair with .

## 2023-03-09 ENCOUNTER — TELEPHONE (OUTPATIENT)
Dept: HEALTH INFORMATION MANAGEMENT | Facility: OTHER | Age: 73
End: 2023-03-09

## 2023-04-13 ENCOUNTER — TELEPHONE (OUTPATIENT)
Dept: HEALTH INFORMATION MANAGEMENT | Facility: OTHER | Age: 73
End: 2023-04-13
Payer: MEDICARE

## 2023-04-13 DIAGNOSIS — Z12.83 SKIN CANCER SCREENING: ICD-10-CM

## 2023-04-13 NOTE — TELEPHONE ENCOUNTER
"1. Caller Name: Sherry Louis                          Call Back Number: [phone]        How would the patient prefer to be contacted with a response: AdEspressohart message    2. SPECIFIC Action To Be Taken: Orders pending, please sign.    3. Diagnosis/Clinical Reason for Request: \"i am looking for a dermatologist to check a new mole and to help me with chronic hyperhydrosis\"    4. Specialty & Provider Name/Lab/Imaging Location: Dermatology    5. Is appointment scheduled for requested order/referral: no    Patient was informed they will receive a return phone call from the office ONLY if there are any questions before processing their request. Advised to call back if they haven't received a call from the referral department in 5 days.  "

## 2023-06-23 ENCOUNTER — OFFICE VISIT (OUTPATIENT)
Dept: DERMATOLOGY | Facility: IMAGING CENTER | Age: 73
End: 2023-06-23
Payer: MEDICARE

## 2023-06-23 DIAGNOSIS — L82.1 SEBORRHEIC KERATOSIS: ICD-10-CM

## 2023-06-23 DIAGNOSIS — L81.4 LENTIGO: ICD-10-CM

## 2023-06-23 DIAGNOSIS — D22.9 NEVUS: ICD-10-CM

## 2023-06-23 DIAGNOSIS — R61 HYPERHIDROSIS: ICD-10-CM

## 2023-06-23 PROCEDURE — 99204 OFFICE O/P NEW MOD 45 MIN: CPT | Performed by: DERMATOLOGY

## 2023-06-23 RX ORDER — GLYCOPYRROLATE 1 MG/1
TABLET ORAL
Qty: 90 TABLET | Refills: 2 | Status: SHIPPED
Start: 2023-06-23 | End: 2024-03-07

## 2023-06-23 NOTE — PROGRESS NOTES
CC:  skin lesion     Subjective: new patient here for skin check    HPI:  skin lesion   Location: under left breast   Time present:  2 months   Painful lesion: No  Itching lesion: No  Enlarging lesion: Yes  Anything make it better or worse?no     Discuss head/scalp sweating  x few years . Affects her job   Saw AWAK video and interested in surgery.    History of skin cancer: No  History of biopsies:No  History of blistering/severe sunburns:Yes, Details: child   Family history of skin cancer: mother , sister  type unkown   Family history of atypical moles:No    ROS: no fevers/chills. No itch.  No cough  Relevant PMH:NC  Social:FS    PE: Gen:WDWN female in NAD. Skin: Scalp/face/eyes/lips/neck/chest/arms/hands.  Limited exam of legs.  No exam of feet/buttocks - without suspicious lesions noted.  Genitals exam declined  -scattered hyperpigmented macules/papules appearing on torso/extremities, appearing benign without suspicious features  -waxy papule chest/breast, appearing benign    A/P: Nevi: benign appearing:  -Reviewed skin cancer detection/prevention  -RTC PRN growth/changes/concerning features    Lentigos/SKs: benign  -reassurance  -reviewed skin cancer detection/prevention    Hyperhidrosis: chronic:  -reviewed dx/tx  -elects to try glycopyrrolate 1mg PO Qday-TID, se reviewed  -if prefers: Botox, electrolysis, sympathsurgery, would refer for services  -declined f/u, f/u PRN     I have reviewed medications relevant to my specialty.

## 2023-09-22 ENCOUNTER — HOSPITAL ENCOUNTER (OUTPATIENT)
Dept: RADIOLOGY | Facility: MEDICAL CENTER | Age: 73
End: 2023-09-22
Attending: FAMILY MEDICINE
Payer: MEDICARE

## 2023-09-22 DIAGNOSIS — Z78.0 POSTMENOPAUSAL ESTROGEN DEFICIENCY: ICD-10-CM

## 2023-09-22 PROCEDURE — 77080 DXA BONE DENSITY AXIAL: CPT

## 2023-11-12 ENCOUNTER — OFFICE VISIT (OUTPATIENT)
Dept: URGENT CARE | Facility: PHYSICIAN GROUP | Age: 73
End: 2023-11-12
Payer: MEDICARE

## 2023-11-12 VITALS
TEMPERATURE: 98 F | SYSTOLIC BLOOD PRESSURE: 128 MMHG | HEIGHT: 60 IN | RESPIRATION RATE: 16 BRPM | DIASTOLIC BLOOD PRESSURE: 70 MMHG | OXYGEN SATURATION: 97 % | HEART RATE: 60 BPM | BODY MASS INDEX: 33.77 KG/M2 | WEIGHT: 172 LBS

## 2023-11-12 DIAGNOSIS — M62.838 TRAPEZIUS MUSCLE SPASM: ICD-10-CM

## 2023-11-12 DIAGNOSIS — S13.4XXA WHIPLASH INJURY TO NECK, INITIAL ENCOUNTER: ICD-10-CM

## 2023-11-12 PROCEDURE — 99213 OFFICE O/P EST LOW 20 MIN: CPT | Performed by: NURSE PRACTITIONER

## 2023-11-12 PROCEDURE — 3074F SYST BP LT 130 MM HG: CPT | Performed by: NURSE PRACTITIONER

## 2023-11-12 PROCEDURE — 3078F DIAST BP <80 MM HG: CPT | Performed by: NURSE PRACTITIONER

## 2023-11-12 RX ORDER — CYCLOBENZAPRINE HCL 10 MG
10 TABLET ORAL 3 TIMES DAILY PRN
Qty: 30 TABLET | Refills: 0 | Status: SHIPPED
Start: 2023-11-12 | End: 2024-03-07

## 2023-11-12 ASSESSMENT — FIBROSIS 4 INDEX: FIB4 SCORE: 2.65

## 2023-11-12 ASSESSMENT — ENCOUNTER SYMPTOMS: NECK PAIN: 1
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
## 2024-03-07 ENCOUNTER — OFFICE VISIT (OUTPATIENT)
Dept: MEDICAL GROUP | Facility: PHYSICIAN GROUP | Age: 74
End: 2024-03-07
Payer: MEDICARE

## 2024-03-07 VITALS
HEART RATE: 58 BPM | SYSTOLIC BLOOD PRESSURE: 130 MMHG | RESPIRATION RATE: 18 BRPM | HEIGHT: 59 IN | OXYGEN SATURATION: 99 % | BODY MASS INDEX: 35.28 KG/M2 | TEMPERATURE: 97.8 F | DIASTOLIC BLOOD PRESSURE: 76 MMHG | WEIGHT: 175 LBS

## 2024-03-07 DIAGNOSIS — E66.01 MORBID (SEVERE) OBESITY DUE TO EXCESS CALORIES (HCC): ICD-10-CM

## 2024-03-07 DIAGNOSIS — R61 HYPERHIDROSIS: ICD-10-CM

## 2024-03-07 DIAGNOSIS — Z00.00 WELL ADULT EXAM: ICD-10-CM

## 2024-03-07 DIAGNOSIS — E66.9 OBESITY (BMI 30-39.9): ICD-10-CM

## 2024-03-07 DIAGNOSIS — K80.50 CHOLEDOCHOLITHIASIS: ICD-10-CM

## 2024-03-07 DIAGNOSIS — N18.31 CHRONIC RENAL FAILURE, STAGE 3A: ICD-10-CM

## 2024-03-07 DIAGNOSIS — F41.9 ANXIETY: ICD-10-CM

## 2024-03-07 DIAGNOSIS — I10 ESSENTIAL HYPERTENSION: ICD-10-CM

## 2024-03-07 PROCEDURE — 99214 OFFICE O/P EST MOD 30 MIN: CPT | Performed by: FAMILY MEDICINE

## 2024-03-07 PROCEDURE — 3078F DIAST BP <80 MM HG: CPT | Performed by: FAMILY MEDICINE

## 2024-03-07 PROCEDURE — 3075F SYST BP GE 130 - 139MM HG: CPT | Performed by: FAMILY MEDICINE

## 2024-03-07 RX ORDER — FLUOXETINE HYDROCHLORIDE 20 MG/1
20 CAPSULE ORAL DAILY
Qty: 90 CAPSULE | Refills: 3 | Status: SHIPPED | OUTPATIENT
Start: 2024-03-07

## 2024-03-07 RX ORDER — OMEPRAZOLE 20 MG/1
20 CAPSULE, DELAYED RELEASE ORAL EVERY EVENING
Qty: 90 CAPSULE | Refills: 3 | Status: SHIPPED | OUTPATIENT
Start: 2024-03-07

## 2024-03-07 RX ORDER — HYDROCHLOROTHIAZIDE 25 MG/1
25 TABLET ORAL DAILY
Qty: 100 TABLET | Refills: 3 | Status: SHIPPED | OUTPATIENT
Start: 2024-03-07

## 2024-03-07 RX ORDER — ATENOLOL 50 MG/1
50 TABLET ORAL DAILY
Qty: 100 TABLET | Refills: 3 | Status: SHIPPED | OUTPATIENT
Start: 2024-03-07

## 2024-03-07 ASSESSMENT — PATIENT HEALTH QUESTIONNAIRE - PHQ9: CLINICAL INTERPRETATION OF PHQ2 SCORE: 0

## 2024-03-07 ASSESSMENT — FIBROSIS 4 INDEX: FIB4 SCORE: 2.68

## 2024-03-07 NOTE — PROGRESS NOTES
Subjective:     CC: Here for medical exam and prescription refills.    HPI:   Sherry presents today with the following medical concerns:    Well adult exam  Patient is here today for physical exam and medication refills.  She is also due for labs.  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  Since last seen she had her gallbladder out.  She states she will call and schedule her mammogram.  She also has a Cologuard box at home that she has not done as of yet.    Morbid (severe) obesity due to excess calories (HCC)  This is a chronic problem.  Patient is trying to watch her diet and exercise.  We did talk about Ozempic and Mounjaro but she really does not want those and I told her they do not work that well anyway.  There is also lots of side effects from it.    Essential hypertension  This is a chronic stable condition.  Medications will be renewed.    Chronic renal failure, stage 3a (HCC)  This is a chronic problem.  Patient has had a very slight decrease in her GFR over the last few years and other times it has been normal.  The last value was done before her gallbladder surgery.  Will recheck it to see if it still below normal and so we will have her avoid NSAIDs.  Diagnosis discussed with patient as well.    Body mass index (BMI) 35.0-35.9, adult  This is a chronic problem.  Diet and exercise discussed.    Anxiety  This is a chronic problem.  Well-controlled on current medications.  Refill will be given today.    Hyperhidrosis  This is a chronic problem.  She states she continues to be bothered with this.  Is particularly bothersome when she sweats on her scalp and face.  In the past has tried medication but it dried her out too much.  She is wondering about someone that could do a Botox injection to see if that would help.  She is try to check around and has not found anybody that could do that.    Past Medical History:   Diagnosis Date     "Hypertension        Social History     Tobacco Use    Smoking status: Former     Current packs/day: 0.00     Types: Cigarettes     Quit date: 1999     Years since quittin.8    Smokeless tobacco: Never   Vaping Use    Vaping Use: Never used   Substance Use Topics    Alcohol use: Yes     Comment: a bottle of wine per week    Drug use: No       Current Outpatient Medications Ordered in Epic   Medication Sig Dispense Refill    atenolol (TENORMIN) 50 MG Tab Take 1 Tablet by mouth every day. 100 Tablet 3    FLUoxetine (PROZAC) 20 MG Cap Take 1 Capsule by mouth every day. 90 Capsule 3    hydroCHLOROthiazide 25 MG Tab Take 1 Tablet by mouth every day. 100 Tablet 3    omeprazole (PRILOSEC) 20 MG delayed-release capsule Take 1 Capsule by mouth every evening. 90 Capsule 3    ibuprofen (MOTRIN) 800 MG Tab Take 1 Tablet by mouth 3 times a day as needed for Moderate Pain. 30 Tablet     acetaminophen (TYLENOL) 500 MG Tab Take 500-1,000 mg by mouth every 6 hours as needed for Moderate Pain.       No current Epic-ordered facility-administered medications on file.       Allergies:  Lisinopril    Health Maintenance: Completed    ROS:  Gen: no fevers/chills, no changes in weight  Eyes: no changes in vision  ENT: no sore throat, no hearing loss, no bloody nose  Pulm: no sob, no cough  CV: no chest pain, no palpitations  GI: no nausea/vomiting, no diarrhea  : no dysuria  MSk: no myalgias  Skin: no rash  Neuro: no headaches, no numbness/tingling  Heme/Lymph: no easy bruising      Objective:       Exam:  /76 (BP Location: Left arm, Patient Position: Sitting, BP Cuff Size: Adult)   Pulse (!) 58   Temp 36.6 °C (97.8 °F) (Temporal)   Resp 18   Ht 1.499 m (4' 11\")   Wt 79.4 kg (175 lb)   SpO2 99%   BMI 35.35 kg/m²  Body mass index is 35.35 kg/m².    Gen: Alert and oriented, No apparent distress.  Eyes:   Extraocular motions intact.  No scleral icterus seen.  Ears:    Ear canals and TMs are clear.  Neck: Neck is supple " without lymphadenopathy.  Thyroid exam is normal.  Lungs: Normal effort, CTA bilaterally, no wheezes, rhonchi, or rales  CV: Regular rate and rhythm. No murmurs, rubs, or gallops.  No carotid bruits heard.  Abdomen: Soft, nontender, no organomegaly or masses.  Ext: No clubbing, cyanosis, edema.  Neuro: Cranial nerves II through VIII are grossly intact.  No lateralized signs are seen.  Gait is normal.  Psych: Patient is alert and cooperative.  No unusual thought processes expressed.  Insight and judgment is good.  She does not appear to be overtly anxious or depressed on today's visit.    Labs: Ordered    Assessment & Plan:     73 y.o. female with the following -     1. Well adult exam  Patient's exam is done today.  Her labs will be ordered and prescriptions refilled.  General healthcare issues addressed.  - Comp Metabolic Panel; Future  - Lipid Profile; Future  - URINALYSIS,CULTURE IF INDICATED; Future  - CBC WITH DIFFERENTIAL; Future  - ESTIMATED GFR; Future    2. Essential hypertension  This is a chronic stable condition.  Continue on current medications.  - Comp Metabolic Panel; Future  - Lipid Profile; Future  - URINALYSIS,CULTURE IF INDICATED; Future  - CBC WITH DIFFERENTIAL; Future  - ESTIMATED GFR; Future    3. Chronic renal failure, stage 3a (HCC)  This is a chronic problem.  Will recheck her labs.  If her GFR still below 60 we will have her ensure that she avoids NSAIDs.  And follow-up.  - Comp Metabolic Panel; Future  - ESTIMATED GFR; Future    4. Obesity (BMI 30-39.9)  This a chronic problem.  Diet and exercise discussed.  - Patient identified as having weight management issue.  Appropriate orders and counseling given.    5. Morbid (severe) obesity due to excess calories (HCC)  This is a chronic problem.  Continue to follow.    6. Anxiety  This is a chronic stable condition.  Medication renewed.    7.  Hyperhidrosis  This is a chronic problem.  I told her I will try to see if dermatology or neurology may  be a specialty that can help her with this.      Return in about 6 months (around 9/7/2024) for Long.    Please note that this dictation was created using voice recognition software. I have made every reasonable attempt to correct obvious errors, but I expect that there are errors of grammar and possibly content that I did not discover before finalizing the note.

## 2024-03-07 NOTE — ASSESSMENT & PLAN NOTE
Patient is here today for physical exam and medication refills.  She is also due for labs.  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  Since last seen she had her gallbladder out.  She states she will call and schedule her mammogram.  She also has a Cologuard box at home that she has not done as of yet.

## 2024-03-07 NOTE — ASSESSMENT & PLAN NOTE
This is a chronic problem.  She states she continues to be bothered with this.  Is particularly bothersome when she sweats on her scalp and face.  In the past has tried medication but it dried her out too much.  She is wondering about someone that could do a Botox injection to see if that would help.  She is try to check around and has not found anybody that could do that.

## 2024-03-07 NOTE — ASSESSMENT & PLAN NOTE
This is a chronic problem.  Patient has had a very slight decrease in her GFR over the last few years and other times it has been normal.  The last value was done before her gallbladder surgery.  Will recheck it to see if it still below normal and so we will have her avoid NSAIDs.  Diagnosis discussed with patient as well.

## 2024-03-07 NOTE — ASSESSMENT & PLAN NOTE
This is a chronic problem.  Patient is trying to watch her diet and exercise.  We did talk about Ozempic and Mounjaro but she really does not want those and I told her they do not work that well anyway.  There is also lots of side effects from it.

## 2024-04-08 ENCOUNTER — TELEPHONE (OUTPATIENT)
Dept: HEALTH INFORMATION MANAGEMENT | Facility: OTHER | Age: 74
End: 2024-04-08

## 2024-09-01 NOTE — ASSESSMENT & PLAN NOTE
This is a chronic problem.  We discussed diet and exercise.  She was told want to get her lab back if it is all normal we could send in a prescription for the Wegovy medication.   English

## 2025-04-16 SDOH — HEALTH STABILITY: PHYSICAL HEALTH: ON AVERAGE, HOW MANY MINUTES DO YOU ENGAGE IN EXERCISE AT THIS LEVEL?: 20 MIN

## 2025-04-16 SDOH — ECONOMIC STABILITY: TRANSPORTATION INSECURITY
IN THE PAST 12 MONTHS, HAS THE LACK OF TRANSPORTATION KEPT YOU FROM MEDICAL APPOINTMENTS OR FROM GETTING MEDICATIONS?: NO

## 2025-04-16 SDOH — ECONOMIC STABILITY: FOOD INSECURITY: WITHIN THE PAST 12 MONTHS, THE FOOD YOU BOUGHT JUST DIDN'T LAST AND YOU DIDN'T HAVE MONEY TO GET MORE.: NEVER TRUE

## 2025-04-16 SDOH — ECONOMIC STABILITY: TRANSPORTATION INSECURITY
IN THE PAST 12 MONTHS, HAS LACK OF RELIABLE TRANSPORTATION KEPT YOU FROM MEDICAL APPOINTMENTS, MEETINGS, WORK OR FROM GETTING THINGS NEEDED FOR DAILY LIVING?: NO

## 2025-04-16 SDOH — ECONOMIC STABILITY: HOUSING INSECURITY
IN THE LAST 12 MONTHS, WAS THERE A TIME WHEN YOU DID NOT HAVE A STEADY PLACE TO SLEEP OR SLEPT IN A SHELTER (INCLUDING NOW)?: NO

## 2025-04-16 SDOH — ECONOMIC STABILITY: TRANSPORTATION INSECURITY
IN THE PAST 12 MONTHS, HAS LACK OF TRANSPORTATION KEPT YOU FROM MEETINGS, WORK, OR FROM GETTING THINGS NEEDED FOR DAILY LIVING?: NO

## 2025-04-16 SDOH — HEALTH STABILITY: PHYSICAL HEALTH: ON AVERAGE, HOW MANY DAYS PER WEEK DO YOU ENGAGE IN MODERATE TO STRENUOUS EXERCISE (LIKE A BRISK WALK)?: 2 DAYS

## 2025-04-16 SDOH — ECONOMIC STABILITY: FOOD INSECURITY: WITHIN THE PAST 12 MONTHS, YOU WORRIED THAT YOUR FOOD WOULD RUN OUT BEFORE YOU GOT MONEY TO BUY MORE.: NEVER TRUE

## 2025-04-16 SDOH — HEALTH STABILITY: MENTAL HEALTH
STRESS IS WHEN SOMEONE FEELS TENSE, NERVOUS, ANXIOUS, OR CAN'T SLEEP AT NIGHT BECAUSE THEIR MIND IS TROUBLED. HOW STRESSED ARE YOU?: TO SOME EXTENT

## 2025-04-16 SDOH — ECONOMIC STABILITY: INCOME INSECURITY: IN THE LAST 12 MONTHS, WAS THERE A TIME WHEN YOU WERE NOT ABLE TO PAY THE MORTGAGE OR RENT ON TIME?: NO

## 2025-04-16 SDOH — ECONOMIC STABILITY: INCOME INSECURITY: HOW HARD IS IT FOR YOU TO PAY FOR THE VERY BASICS LIKE FOOD, HOUSING, MEDICAL CARE, AND HEATING?: SOMEWHAT HARD

## 2025-04-16 ASSESSMENT — LIFESTYLE VARIABLES
HOW OFTEN DO YOU HAVE A DRINK CONTAINING ALCOHOL: 2-4 TIMES A MONTH
HOW OFTEN DO YOU HAVE SIX OR MORE DRINKS ON ONE OCCASION: NEVER
AUDIT-C TOTAL SCORE: 2
HOW MANY STANDARD DRINKS CONTAINING ALCOHOL DO YOU HAVE ON A TYPICAL DAY: 1 OR 2
SKIP TO QUESTIONS 9-10: 1

## 2025-04-16 ASSESSMENT — SOCIAL DETERMINANTS OF HEALTH (SDOH)
HOW MANY DRINKS CONTAINING ALCOHOL DO YOU HAVE ON A TYPICAL DAY WHEN YOU ARE DRINKING: 1 OR 2
HOW OFTEN DO YOU ATTENT MEETINGS OF THE CLUB OR ORGANIZATION YOU BELONG TO?: NEVER
IN A TYPICAL WEEK, HOW MANY TIMES DO YOU TALK ON THE PHONE WITH FAMILY, FRIENDS, OR NEIGHBORS?: MORE THAN THREE TIMES A WEEK
HOW HARD IS IT FOR YOU TO PAY FOR THE VERY BASICS LIKE FOOD, HOUSING, MEDICAL CARE, AND HEATING?: SOMEWHAT HARD
HOW OFTEN DO YOU ATTENT MEETINGS OF THE CLUB OR ORGANIZATION YOU BELONG TO?: NEVER
IN A TYPICAL WEEK, HOW MANY TIMES DO YOU TALK ON THE PHONE WITH FAMILY, FRIENDS, OR NEIGHBORS?: MORE THAN THREE TIMES A WEEK
HOW OFTEN DO YOU HAVE A DRINK CONTAINING ALCOHOL: 2-4 TIMES A MONTH
HOW OFTEN DO YOU GET TOGETHER WITH FRIENDS OR RELATIVES?: ONCE A WEEK
DO YOU BELONG TO ANY CLUBS OR ORGANIZATIONS SUCH AS CHURCH GROUPS UNIONS, FRATERNAL OR ATHLETIC GROUPS, OR SCHOOL GROUPS?: NO
WITHIN THE PAST 12 MONTHS, YOU WORRIED THAT YOUR FOOD WOULD RUN OUT BEFORE YOU GOT THE MONEY TO BUY MORE: NEVER TRUE
HOW OFTEN DO YOU GET TOGETHER WITH FRIENDS OR RELATIVES?: ONCE A WEEK
HOW OFTEN DO YOU ATTEND CHURCH OR RELIGIOUS SERVICES?: NEVER
HOW OFTEN DO YOU HAVE SIX OR MORE DRINKS ON ONE OCCASION: NEVER
DO YOU BELONG TO ANY CLUBS OR ORGANIZATIONS SUCH AS CHURCH GROUPS UNIONS, FRATERNAL OR ATHLETIC GROUPS, OR SCHOOL GROUPS?: NO
HOW OFTEN DO YOU ATTEND CHURCH OR RELIGIOUS SERVICES?: NEVER
IN THE PAST 12 MONTHS, HAS THE ELECTRIC, GAS, OIL, OR WATER COMPANY THREATENED TO SHUT OFF SERVICE IN YOUR HOME?: NO

## 2025-04-17 ENCOUNTER — APPOINTMENT (OUTPATIENT)
Dept: MEDICAL GROUP | Facility: PHYSICIAN GROUP | Age: 75
End: 2025-04-17
Payer: MEDICARE

## 2025-04-17 VITALS
HEART RATE: 64 BPM | SYSTOLIC BLOOD PRESSURE: 124 MMHG | WEIGHT: 174.6 LBS | OXYGEN SATURATION: 97 % | HEIGHT: 59 IN | RESPIRATION RATE: 16 BRPM | DIASTOLIC BLOOD PRESSURE: 74 MMHG | BODY MASS INDEX: 35.2 KG/M2 | TEMPERATURE: 98.2 F

## 2025-04-17 DIAGNOSIS — Z00.00 WELL ADULT EXAM: ICD-10-CM

## 2025-04-17 DIAGNOSIS — F41.9 ANXIETY: ICD-10-CM

## 2025-04-17 DIAGNOSIS — E66.01 SEVERE OBESITY (HCC): ICD-10-CM

## 2025-04-17 DIAGNOSIS — I10 ESSENTIAL HYPERTENSION: ICD-10-CM

## 2025-04-17 DIAGNOSIS — M67.449 DIGITAL MUCINOUS CYST OF FINGER: ICD-10-CM

## 2025-04-17 DIAGNOSIS — Z12.11 COLON CANCER SCREENING: ICD-10-CM

## 2025-04-17 DIAGNOSIS — N18.31 CHRONIC RENAL FAILURE, STAGE 3A: ICD-10-CM

## 2025-04-17 PROCEDURE — 3074F SYST BP LT 130 MM HG: CPT | Performed by: FAMILY MEDICINE

## 2025-04-17 PROCEDURE — 99214 OFFICE O/P EST MOD 30 MIN: CPT | Performed by: FAMILY MEDICINE

## 2025-04-17 PROCEDURE — 3078F DIAST BP <80 MM HG: CPT | Performed by: FAMILY MEDICINE

## 2025-04-17 RX ORDER — HYDROCHLOROTHIAZIDE 25 MG/1
25 TABLET ORAL DAILY
Qty: 100 TABLET | Refills: 3 | Status: SHIPPED | OUTPATIENT
Start: 2025-04-17

## 2025-04-17 RX ORDER — OMEPRAZOLE 20 MG/1
20 CAPSULE, DELAYED RELEASE ORAL EVERY EVENING
Qty: 100 CAPSULE | Refills: 3 | Status: SHIPPED | OUTPATIENT
Start: 2025-04-17

## 2025-04-17 RX ORDER — ATENOLOL 50 MG/1
50 TABLET ORAL DAILY
Qty: 100 TABLET | Refills: 3 | Status: SHIPPED | OUTPATIENT
Start: 2025-04-17

## 2025-04-17 ASSESSMENT — PATIENT HEALTH QUESTIONNAIRE - PHQ9: CLINICAL INTERPRETATION OF PHQ2 SCORE: 0

## 2025-04-17 NOTE — ASSESSMENT & PLAN NOTE
This is a chronic problem.  Patient states she is currently on a GLP-1 medication that she got about a month ago.  She is tolerating it well and states she is lost about 5 pounds.  She is seeing a different provider for that.

## 2025-04-17 NOTE — ASSESSMENT & PLAN NOTE
This is a new problem.  Patient has a cystic lesion on the distal joint of the right fifth finger.  She states it does not bother her she just wants it looked at.

## 2025-04-17 NOTE — PROGRESS NOTES
Subjective:     CC: Here for annual follow-up and medication refills.    HPI:   Sherry presents today with following medical concerns:    Well adult exam   Patient is here for annual exam and also is due for labs.  She needs her medications refilled as well.  Overall she states she is feeling well.This year she is willing to do the Cologuard so we will order that for her.     Essential hypertension  This is a chronic stable condition.  Blood pressure well-controlled on current medications.    Digital mucinous cyst of finger  This is a new problem.  Patient has a cystic lesion on the distal joint of the right fifth finger.  She states it does not bother her she just wants it looked at.    Chronic renal failure, stage 3a (HCC)  This is a chronic stable condition.  Lab work for follow-up.  Continue to avoid NSAIDs.    Body mass index (BMI) 35.0-35.9, adult  This is a chronic problem.  Patient states she is currently on a GLP-1 medication that she got about a month ago.  She is tolerating it well and states she is lost about 5 pounds.  She is seeing a different provider for that.    Anxiety  This is a chronic problem.  Well-controlled on current medications.  She does need a refill today.    Past Medical History:   Diagnosis Date    Hypertension        Social History     Tobacco Use    Smoking status: Former     Current packs/day: 0.00     Types: Cigarettes     Quit date: 1999     Years since quittin.9    Smokeless tobacco: Never   Vaping Use    Vaping status: Never Used   Substance Use Topics    Alcohol use: Yes     Alcohol/week: 1.2 oz     Types: 2 Glasses of wine per week     Comment: a bottle of wine per week    Drug use: No       Current Outpatient Medications Ordered in Epic   Medication Sig Dispense Refill    atenolol (TENORMIN) 50 MG Tab Take 1 Tablet by mouth every day. 100 Tablet 3    FLUoxetine (PROZAC) 20 MG Cap Take 1 Capsule by mouth every day. 100 Capsule 3    hydroCHLOROthiazide 25 MG Tab Take 1  "Tablet by mouth every day. 100 Tablet 3    omeprazole (PRILOSEC) 20 MG delayed-release capsule Take 1 Capsule by mouth every evening. 100 Capsule 3     No current [facility]-ordered facility-administered medications on file.       Allergies:  Lisinopril    Health Maintenance: Completed    ROS:  Gen: no fevers/chills,   Eyes: no changes in vision  ENT: no sore throat, no hearing loss, no bloody nose  Pulm: no sob, no cough  CV: no chest pain, no palpitations  GI: no nausea/vomiting, no diarrhea  : no dysuria  MSk: no myalgias  Skin: no rash  Neuro: no headaches, no numbness/tingling  Heme/Lymph: no easy bruising      Objective:       Exam:  /74 (BP Location: Left arm, Patient Position: Sitting, BP Cuff Size: Adult)   Pulse 64   Temp 36.8 °C (98.2 °F) (Temporal)   Resp 16   Ht 1.499 m (4' 11\")   Wt 79.2 kg (174 lb 9.6 oz)   SpO2 97%   BMI 35.26 kg/m²  Body mass index is 35.26 kg/m².    Gen: Alert and oriented, No apparent distress.  Eyes:   Extraocular motions intact.  No scleral icterus seen.  Ears:    Ear canals and TMs are clear.  Neck: Neck is supple without lymphadenopathy.  Thyroid exam is normal.  Lungs: Normal effort, CTA bilaterally, no wheezes, rhonchi, or rales  CV: Regular rate and rhythm. No murmurs, rubs, or gallops.  No carotid bruits heard.  Abdomen: Soft, nontender, and organomegaly or masses.  Ext: No clubbing, cyanosis, edema.  Patient does have a digital mucous send assist on the posterior aspect of the DIP joint fifth finger.  Neuro: Cranial nerves II through VIII are grossly intact.  No lateralized signs are seen.  Gait is normal.  Psych: Patient is alert cooperative.  No unusual thought processes expressed.  Insight and judgment is good.  Does not appear to be overtly anxious or depressed on today's visit.  Labs: Ordered    Assessment & Plan:     74 y.o. female with the following -     1. Essential hypertension  This is a chronic stable condition.  Continue current medications.  - Comp " Metabolic Panel; Future  - Lipid Profile; Future  - URINALYSIS,CULTURE IF INDICATED; Future  - ESTIMATED GFR; Future  - CBC WITH DIFFERENTIAL; Future    2. Well adult exam  Patient's exam was normal for today General Health issues addressed and baseline labs ordered.  - Comp Metabolic Panel; Future  - Lipid Profile; Future  - URINALYSIS,CULTURE IF INDICATED; Future  - ESTIMATED GFR; Future  - CBC WITH DIFFERENTIAL; Future    3. Colon cancer screening  This is a screening test.  Lab ordered.  - Cologuard® colon cancer screening    4. Severe obesity (HCC)  This is a chronic problem.  Patient is working on diet and exercise.    5. Body mass index (BMI) of 35.0-35.9 in adult  This is a chronic problem.  Patient is working on it.    6. Chronic renal failure, stage 3a  This is a chronic problem.  Lab ordered for follow-up.  Continue to avoid NSAIDs.    7. Anxiety  This is a chronic stable condition.  Medication renewed.      8. Digital mucinous cyst of finger  This is a new problem.  I offered referral to orthopedics and she declined.  Will continue to follow.    HCC Gap Form    Diagnosis: E66.01 - Severe obesity (HCC)  Z68.35 - Body mass index (BMI) of 35.0-35.9 in adult  Comorbidity Diagnosis: Essential hypertension  The current BMI is 35.26 kg/m² as of 04/17/25.    Past BMI Values:  04/17/25 : 35.26 kg/m². 03/07/24 : 35.35 kg/m². 11/12/23 : 33.59 kg/m².   Assessment and plan: Chronic, improving. Encouraged healthy diet and physical activity changes with a goal of weight loss. Follow up at least annually. The comorbid condition is improving based on today's assessment. Continue current treatment plan including control of risk factors. Follow up at least yearly.    Diagnosis to address: N18.31 - Chronic renal failure, stage 3a  Assessment and plan: Chronic, stable. Continue with current defined treatment plan: Stable condition.  Knows to avoid NSAIDs.. Follow-up at least annually.  Last edited 04/17/25 10:55 PDT by Sawyer  REFUGIO Martínez III, M.D.         Return in about 6 months (around 10/17/2025) for Long.    Please note that this dictation was created using voice recognition software. I have made every reasonable attempt to correct obvious errors, but I expect that there are errors of grammar and possibly content that I did not discover before finalizing the note.

## 2025-04-17 NOTE — ASSESSMENT & PLAN NOTE
This is a chronic problem.  Well-controlled on current medications.  She does need a refill today.
